# Patient Record
Sex: FEMALE | Race: WHITE | NOT HISPANIC OR LATINO | Employment: FULL TIME | URBAN - METROPOLITAN AREA
[De-identification: names, ages, dates, MRNs, and addresses within clinical notes are randomized per-mention and may not be internally consistent; named-entity substitution may affect disease eponyms.]

---

## 2017-02-04 ENCOUNTER — OFFICE VISIT (OUTPATIENT)
Dept: URGENT CARE | Facility: URGENT CARE | Age: 22
End: 2017-02-04
Payer: OTHER GOVERNMENT

## 2017-02-04 VITALS
WEIGHT: 144.6 LBS | HEART RATE: 104 BPM | SYSTOLIC BLOOD PRESSURE: 102 MMHG | RESPIRATION RATE: 16 BRPM | OXYGEN SATURATION: 97 % | TEMPERATURE: 98.6 F | DIASTOLIC BLOOD PRESSURE: 66 MMHG

## 2017-02-04 DIAGNOSIS — J10.1 INFLUENZA B: Primary | ICD-10-CM

## 2017-02-04 DIAGNOSIS — R50.9 FEVER, UNSPECIFIED: ICD-10-CM

## 2017-02-04 LAB
FLUAV+FLUBV AG SPEC QL: ABNORMAL
FLUAV+FLUBV AG SPEC QL: ABNORMAL
SPECIMEN SOURCE: ABNORMAL

## 2017-02-04 PROCEDURE — 87804 INFLUENZA ASSAY W/OPTIC: CPT | Performed by: FAMILY MEDICINE

## 2017-02-04 PROCEDURE — 99202 OFFICE O/P NEW SF 15 MIN: CPT | Performed by: FAMILY MEDICINE

## 2017-02-04 NOTE — MR AVS SNAPSHOT
After Visit Summary   2/4/2017    Silke Crawford    MRN: [id number]           Patient Information     Date Of Birth          1995        Visit Information        Provider Department      2/4/2017 8:15 PM Michael Miranda MD [facility]        Today's Diagnoses     Influenza B    -  1     Fever, unspecified           Care Instructions    Drink plenty of liquids    Get plenty of rest    Tylenol, ibuprofen for the fever, headaches, and body aches.     Go to the emergency room if you develop worsening shortness of breath or if you feel like you experience severe lightheadedness..        Influenza (Adult)    Influenza is also called the flu. It is a viral illness that affects the air passages of your lungs. It is different from the common cold. The flu can easily be passed from one to person to another. It may be spread through the air by coughing and sneezing. Or it can be spread by touching the sick person and then touching your own eyes, nose, or mouth.  The flu starts 1 to 3 days after you are exposed to the flu virus. It may last for 1 to 2 weeks. You usually don t need to take antibiotics unless you have a complication. This might be an ear or sinus infection or pneumonia.  Symptoms of the flu may be mild or severe. They can include extreme tiredness (wanting to stay in bed all day), chills, fevers, muscle aches, soreness with eye movement, headache, and a dry, hacking cough.  Home care  Follow these guidelines when caring for yourself at home:    Avoid being around cigarette smoke, whether yours or other people s.    Acetaminophen or ibuprofen will help ease your fever, muscle aches, and headache. Don t give aspirin to anyone younger than 18 who has the flu. Aspirin can harm the liver.    Nausea and loss of appetite are common with the flu. Eat light meals. Drink 6 to 8 glasses of liquids every day. Good choices are water, sport drinks, soft drinks without caffeine, juices, tea, and  soup. Extra fluids will also help loosen secretions in your nose and lungs.    Over-the-counter cold medicines will not make the flu go away faster. But the medicines may help with coughing, sore throat, and congestion in your nose and sinuses. Don t use a decongestant if you have high blood pressure.    Stay home until your fever has been gone for at least 24 hours without using medicine to reduce fever.  Follow-up care  Follow up with your health care provider, or as advised, if you are not getting better over the next week.  If you are 65 or older, talk with your provider about getting a pneumococcal vaccine every 5 years. You should also get this vaccine if you have chronic asthma or COPD. All adults should get a flu vaccine every fall. Ask your provider about this.  When to seek medical advice  Call your health care provider right away if any of these occur:    Cough with lots of colored sputum (mucus) or blood in your sputum    Chest pain, shortness of breath, wheezing, or difficulty breathing    Severe headache, or face, neck, or ear pain    New rash with fever    Fever of 101 F (38 C) oral or higher that doesn t get better with fever medicine    Confusion, behavior change, or seizure    Severe weakness or dizziness    You get a fever or cough after getting better for a few days       9078-0685 [employer]. [address] [place] [address], [place] [address]. All rights reserved. This information is not intended as a substitute for professional medical care. Always follow your healthcare professional's instructions.              Follow-ups after your visit        Who to contact     If you have questions or need follow up information about today's clinic visit or your schedule please contact [facility] directly at [phone].  Normal or non-critical lab and imaging results will be communicated to you by MyChart, letter or phone within 4 business days after the clinic has received the  results. If you do not hear from us within 7 days, please contact the clinic through [employer] or phone. If you have a critical or abnormal lab result, we will notify you by phone as soon as possible.  Submit refill requests through [employer] or call your pharmacy and they will forward the refill request to us. Please allow 3 business days for your refill to be completed.          Additional Information About Your Visit        "[email or web address] Information     [employer] gives you secure access to your electronic health record. If you see a primary care provider, you can also send messages to your care team and make appointments. If you have questions, please call your primary care clinic.  If you do not have a primary care provider, please call [phone] and they will assist you.        Care EveryWhere ID     This is your Care EveryWhere ID. This could be used by other organizations to access your Crawford medical records  [id number]        Your Vitals Were     Pulse Temperature Respirations Pulse Oximetry          104 98.6  F (37  C) (Oral) 16 97%         Blood Pressure from Last 3 Encounters:   02/04/17 102/66   12/19/15 117/71   03/30/15 120/76    Weight from Last 3 Encounters:   02/04/17 144 lb 9.6 oz (65.59 kg)              We Performed the Following     Influenza A/B antigen        Primary Care Provider Office Phone # Fax #    Shira Bustillos [phone]       [facility] [place] [address] [place] [address]        Thank you!     Thank you for choosing [facility]  for your care. Our goal is always to provide you with excellent care. Hearing back from our patients is one way we can continue to improve our services. Please take a few minutes to complete the written survey that you may receive in the mail after your visit with us. Thank you!             Your Updated Medication List - Protect others around you: Learn how to safely use, store and throw away your  medicines at www.disposemymeds.org.          This list is accurate as of: 2/4/17  9:13 PM.  Always use your most recent med list.                   Brand Name Dispense Instructions for use    PROBIOTIC ACIDOPHILUS PO          SPRINTEC 28 PO          WELLBUTRIN PO

## 2017-02-05 NOTE — PROGRESS NOTES
Chief Complaint   Patient presents with     Sinus Problem     fever onset wed am 104, congestion, ST, chest congestion, difficulty breathing, sinus pain, jaw pain, ear pain, head pain x 4 days OTC: IBU, tylenol     Urgent Care       Initial /66 mmHg  Pulse 104  Temp(Src) 98.6  F (37  C) (Oral)  Resp 16  Wt 144 lb 9.6 oz (65.59 kg)  SpO2 97% There is no height on file to calculate BMI.  BP completed using cuff size: keshav Juarez CMA                                2/4/2017 8:24 PM

## 2017-02-05 NOTE — PATIENT INSTRUCTIONS
Drink plenty of liquids    Get plenty of rest    Tylenol, ibuprofen for the fever, headaches, and body aches.     Go to the emergency room if you develop worsening shortness of breath or if you feel like you experience severe lightheadedness..        Influenza (Adult)    Influenza is also called the flu. It is a viral illness that affects the air passages of your lungs. It is different from the common cold. The flu can easily be passed from one to person to another. It may be spread through the air by coughing and sneezing. Or it can be spread by touching the sick person and then touching your own eyes, nose, or mouth.  The flu starts 1 to 3 days after you are exposed to the flu virus. It may last for 1 to 2 weeks. You usually don t need to take antibiotics unless you have a complication. This might be an ear or sinus infection or pneumonia.  Symptoms of the flu may be mild or severe. They can include extreme tiredness (wanting to stay in bed all day), chills, fevers, muscle aches, soreness with eye movement, headache, and a dry, hacking cough.  Home care  Follow these guidelines when caring for yourself at home:    Avoid being around cigarette smoke, whether yours or other people s.    Acetaminophen or ibuprofen will help ease your fever, muscle aches, and headache. Don t give aspirin to anyone younger than 18 who has the flu. Aspirin can harm the liver.    Nausea and loss of appetite are common with the flu. Eat light meals. Drink 6 to 8 glasses of liquids every day. Good choices are water, sport drinks, soft drinks without caffeine, juices, tea, and soup. Extra fluids will also help loosen secretions in your nose and lungs.    Over-the-counter cold medicines will not make the flu go away faster. But the medicines may help with coughing, sore throat, and congestion in your nose and sinuses. Don t use a decongestant if you have high blood pressure.    Stay home until your fever has been gone for at least 24 hours  without using medicine to reduce fever.  Follow-up care  Follow up with your health care provider, or as advised, if you are not getting better over the next week.  If you are 65 or older, talk with your provider about getting a pneumococcal vaccine every 5 years. You should also get this vaccine if you have chronic asthma or COPD. All adults should get a flu vaccine every fall. Ask your provider about this.  When to seek medical advice  Call your health care provider right away if any of these occur:    Cough with lots of colored sputum (mucus) or blood in your sputum    Chest pain, shortness of breath, wheezing, or difficulty breathing    Severe headache, or face, neck, or ear pain    New rash with fever    Fever of 101 F (38 C) oral or higher that doesn t get better with fever medicine    Confusion, behavior change, or seizure    Severe weakness or dizziness    You get a fever or cough after getting better for a few days       8524-7393 [employer]. [address] [place] [address], [place] [address]. All rights reserved. This information is not intended as a substitute for professional medical care. Always follow your healthcare professional's instructions.

## 2017-02-05 NOTE — PROGRESS NOTES
SUBJECTIVE:   Silke Crawford is a 21 year old female presenting with a chief complaint of fevers up to 104 F, nasal congestion, sore throat, chest congestion,, sinus pain (bilateral forehead , pressure when smiling and when bending over.  Also, some pain at the cheeks), jaw joint  (bilateral), bilateral forehead and temple head pain  Onset of symptoms was three days ago.  Course of illness is still present.     Severity severe.   Current and Associated symptoms: aas listed above. Treatment measures tried include Motrin, Tylenol Severe, Sawyer-Synephrin nasal spray. .  Predisposing factors include dad has been ill with chest congestion and deep cough.  .    Past medical history:    No major medical problems    Current Outpatient Prescriptions   Medication Sig Dispense Refill     Lactobacillus (PROBIOTIC ACIDOPHILUS PO)        BuPROPion HCl (WELLBUTRIN PO)        Norgestimate-Eth Estradiol (SPRINTEC 28 PO)        Social History   Substance Use Topics     Smoking status: Never Smoker      Smokeless tobacco: Not on file     Alcohol Use: Yes       ROS:  Review of systems negative except as stated above.    OBJECTIVE  :/66 mmHg  Pulse 104  Temp(Src) 98.6  F (37  C) (Oral)  Resp 16  Wt 144 lb 9.6 oz (65.59 kg)  SpO2 97%  GENERAL APPEARANCE: healthy, alert and no distress. No acute respiratory distress.  Patient is sniffling a lot.  Patient looks fatigued.    EYES: Eyes grossly normal to inspection  HENT: TM's normal bilaterally, nasal turbinates erythematous, swollen and oropharynx is mildly erythematous without exudates.   NECK: cervical adenopathy at the bilateral submandibular areas.   RESP: lungs clear to auscultation - no rales, rhonchi or wheezes  CV: regular rates and rhythm, normal S1 S2, no murmur noted    LABS:    Results for orders placed or performed in visit on 02/04/17   Influenza A/B antigen   Result Value Ref Range    Influenza A/B Agn Specimen Nasal     Influenza A  NEG     Negative   Test results  must be correlated with clinical data. If necessary, results   should be confirmed by a molecular assay or viral culture.      Influenza B (A) NEG     Positive   Test results must be correlated with clinical data. If necessary, results   should be confirmed by a molecular assay or viral culture.           ASSESSMENT:  Fever  Influenza B    PLAN:  Fluids, Rest, Tylenol, Ibuprofen  Go to the emergency room if you develop worsening shortness of breath  See orders in Epic    Michael Miranda MD

## 2017-02-23 ENCOUNTER — OFFICE VISIT (OUTPATIENT)
Dept: URGENT CARE | Facility: URGENT CARE | Age: 22
End: 2017-02-23
Payer: OTHER GOVERNMENT

## 2017-02-23 VITALS
HEART RATE: 109 BPM | TEMPERATURE: 99.3 F | OXYGEN SATURATION: 97 % | WEIGHT: 143 LBS | DIASTOLIC BLOOD PRESSURE: 70 MMHG | SYSTOLIC BLOOD PRESSURE: 122 MMHG

## 2017-02-23 DIAGNOSIS — B96.89 BACTERIAL VAGINOSIS: ICD-10-CM

## 2017-02-23 DIAGNOSIS — N76.0 BACTERIAL VAGINOSIS: ICD-10-CM

## 2017-02-23 DIAGNOSIS — J02.9 ACUTE PHARYNGITIS, UNSPECIFIED: Primary | ICD-10-CM

## 2017-02-23 DIAGNOSIS — N89.8 VAGINAL DISCHARGE: ICD-10-CM

## 2017-02-23 LAB
DEPRECATED S PYO AG THROAT QL EIA: NORMAL
MICRO REPORT STATUS: ABNORMAL
MICRO REPORT STATUS: NORMAL
SPECIMEN SOURCE: ABNORMAL
SPECIMEN SOURCE: NORMAL
WET PREP SPEC: ABNORMAL

## 2017-02-23 PROCEDURE — 87591 N.GONORRHOEAE DNA AMP PROB: CPT | Performed by: FAMILY MEDICINE

## 2017-02-23 PROCEDURE — 87081 CULTURE SCREEN ONLY: CPT | Performed by: FAMILY MEDICINE

## 2017-02-23 PROCEDURE — 99214 OFFICE O/P EST MOD 30 MIN: CPT | Performed by: FAMILY MEDICINE

## 2017-02-23 PROCEDURE — 87210 SMEAR WET MOUNT SALINE/INK: CPT | Performed by: FAMILY MEDICINE

## 2017-02-23 PROCEDURE — 87880 STREP A ASSAY W/OPTIC: CPT | Performed by: FAMILY MEDICINE

## 2017-02-23 PROCEDURE — 87491 CHLMYD TRACH DNA AMP PROBE: CPT | Performed by: FAMILY MEDICINE

## 2017-02-23 RX ORDER — METRONIDAZOLE 500 MG/1
500 TABLET ORAL 2 TIMES DAILY
Qty: 14 TABLET | Refills: 0 | Status: SHIPPED | OUTPATIENT
Start: 2017-02-23 | End: 2017-03-02

## 2017-02-23 RX ORDER — DIPHENHYDRAMINE HYDROCHLORIDE AND LIDOCAINE HYDROCHLORIDE AND ALUMINUM HYDROXIDE AND MAGNESIUM HYDRO
10 KIT EVERY 6 HOURS PRN
Qty: 237 ML | Refills: 1 | Status: SHIPPED | OUTPATIENT
Start: 2017-02-23 | End: 2019-01-28

## 2017-02-23 NOTE — PROGRESS NOTES
SUBJECTIVE:   Silke Crawford is a 21 year old female presenting with a chief complaint of sore throat.  Low grade fever.  Onset of symptoms was 3 day(s) ago.  Course of illness is worsening.    Severity moderate  Current and Associated symptoms: sore throat  Treatment measures tried include Fluids, OTC meds - ibuprofen and Rest.  Predisposing factors include ill contacts, positive for influenza B - too late for treatment.    Patient also concerned about vaginal discharge for about 1 week.  Positive for odor, denies any itchiness.  Patient has noted some cramps.  Currently in monogamous relationship, would like to screen for STD just in case.    No past medical history on file.  Current Outpatient Prescriptions   Medication Sig Dispense Refill     Lactobacillus (PROBIOTIC ACIDOPHILUS PO)        BuPROPion HCl (WELLBUTRIN PO)        Norgestimate-Eth Estradiol (SPRINTEC 28 PO)        Social History   Substance Use Topics     Smoking status: Never Smoker     Smokeless tobacco: Not on file     Alcohol use Yes       ROS:  CONSTITUTIONAL:NEGATIVE for fever, chills, change in weight and POSITIVE  for fatigue and malaise  INTEGUMENTARY/SKIN: NEGATIVE for worrisome rashes, moles or lesions  ENT/MOUTH: POSITIVE for nasal congestion, sore throat and swollen glands  RESP:NEGATIVE for significant cough or SOB  CV: NEGATIVE for chest pain, palpitations or peripheral edema  GI: NEGATIVE for nausea, abdominal pain, heartburn, or change in bowel habits  : negative for, dysuria, frequency and POSITIVE for vaginal discharge  MUSCULOSKELETAL: NEGATIVE for significant arthralgias or myalgia  PSYCHIATRIC: NEGATIVE for changes in mood or affect    OBJECTIVE  :/70  Pulse 109  Temp 99.3  F (37.4  C) (Tympanic)  Wt 143 lb (64.9 kg)  SpO2 97%  GENERAL APPEARANCE: healthy, alert and no distress  EYES: EOMI,  PERRL, conjunctiva clear  HENT: ear canals and TM's normal.  Nose and mouth without ulcers, erythema or lesions.  Pharynx mildly  redden, no exudates.  No sinus tenderness on percussion.  NECK: supple, nontender, no lymphadenopathy  RESP: lungs clear to auscultation - no rales, rhonchi or wheezes  CV: regular rates and rhythm, normal S1 S2, no murmur noted  ABDOMEN:  soft, nontender, no HSM or masses and bowel sounds normal  NEURO: Normal strength and tone, sensory exam grossly normal,  normal speech and mentation  SKIN: no suspicious lesions or rashes  PSYCH: mentation appears normal and affect normal/bright    Results for orders placed or performed in visit on 02/23/17   Rapid strep screen   Result Value Ref Range    Specimen Description Throat     Rapid Strep A Screen       NEGATIVE: No Group A streptococcal antigen detected by immunoassay, await   culture report.      Micro Report Status FINAL 02/23/2017    Wet prep   Result Value Ref Range    Specimen Description Vagina     Wet Prep (A)      No Trichomonas seen  No yeast seen  Clue cells seen      Micro Report Status FINAL 02/23/2017        ASSESSMENT/PLAN:  (J02.9) Acute pharyngitis, unspecified  (primary encounter diagnosis)  Comment: viral  Plan: Rapid strep screen, Beta strep group A culture,        DPH-Lido-AlHydr-MgHydr-Simeth (FIRST-MOUTHWASH         BLM) SUSP            (N89.8) Vaginal discharge  Comment: BV  Plan: Wet prep, Neisseria gonorrhoeae PCR, Chlamydia         trachomatis PCR            (N76.0,  B96.89) Bacterial vaginosis  Plan: metroNIDAZOLE (FLAGYL) 500 MG tablet            Reassurance given, reviewed symptomatic treatment, plenty of fluids and rest.  RX magic mouthwash given for symptom treatment, will follow up on throat culture and treat if positive for strep.    Reviewed positive BV and discussed treatment options, encourage increase yogurt or probiotic to help with vaginal shaw.  Discussed safe sex, will follow up on STD screen and notify if any abnormalities.    Work excuse note given.  Return to clinic if no resolution of symptoms.      Bharath Alvarado MD  February  23, 2017 10:37 AM

## 2017-02-23 NOTE — PATIENT INSTRUCTIONS
Okay to take ibuprofen 200 mg - 4 tablets (800 mg) every 8 hours as needed.  Okay to take tylenol 500 mg - 2 tablets (1000 mg) every 6-8 hours as needed, do not exceed 3000 mg in 24 hours.  Okay to take magic mouthwash to help with sore throat.  We will contact you if the throat culture is positive for strep.    Take full course of antibiotic for bacterial vaginosis.  No alcohol while on antibiotic.      Viral Pharyngitis (Sore Throat)    You (or your child, if your child is the patient) have pharyngitis (sore throat). This infection is caused by a virus. It can cause throat pain that is worse when swallowing, aching all over, headache, and fever. The infection may be spread by coughing, kissing, or touching others after touching your mouth or nose. Antibiotic medications do not work against viruses, so they are not used for treating this condition.  Home care    If your symptoms are severe, rest at home. Return to work or school when you feel well enough.     Drink plenty of fluids to avoid dehydration.    For children: Use acetaminophen for fever, fussiness or discomfort. In infants over six months of age, you may use ibuprofen instead of acetaminophen. (NOTE: If your child has chronic liver or kidney disease or ever had a stomach ulcer or GI bleeding, talk with your doctor before using these medicines.) (NOTE: Aspirin should never be used in anyone under 18 years of age who is ill with a fever. It may cause severe liver damage.)     For adults: You may use acetaminophen or ibuprofen to control pain or fever, unless another medicine was prescribed for this. (NOTE: If you have chronic liver or kidney disease or ever had a stomach ulcer or GI bleeding, talk with your doctor before using these medicines.)    Throat lozenges or numbing throat sprays can help reduce pain. Gargling with warm salt water will also help reduce throat pain. For this, dissolve 1/2 teaspoon of salt in 1 glass of warm water. To help soothe a  sore throat, children can sip on juice or a popsicle. Children 5 years and older can also suck on a lollipop or hard candy.    Avoid salty or spicy foods, which can be irritating to the throat.  Follow-up care  Follow up with your healthcare provider or our staff if you are not improving over the next week.  When to seek medical advice  Call your healthcare provider right away if any of these occur:    Fever as directed by your doctor.  For children, seek care if:    Your child is of any age and has repeated fevers above 104 F (40 C).    Your child is younger than 2 years of age and has a fever of 100.4 F (38 C) that continues for more than 1 day.    Your child is 2 years old or older and has a fever of 100.4 F (38 C) that continues for more than 3 days.    New or worsening ear pain, sinus pain, or headache    Painful lumps in the back of neck    Stiff neck    Lymph nodes are getting larger    Inability to swallow liquids, excessive drooling, or inability to open mouth wide due to throat pain    Signs of dehydration (very dark urine or no urine, sunken eyes, dizziness)    Trouble breathing or noisy breathing    Muffled voice    New rash    Child appears to be getting sicker    6025-2730 [employer]. [address] [place] [address], [place] [address]. All rights reserved. This information is not intended as a substitute for professional medical care. Always follow your healthcare professional's instructions.        Bacterial Vaginosis    You have a vaginal infection called bacterial vaginosis (BV). Both good and bad bacteria are present in a healthy vagina. BV occurs when these bacteria get out of balance. The number of bad bacteria increase. And the number of good bacteria decrease.  BV may or may not cause symptoms. If symptoms do occur, they can include:    Thin, gray, milky-white, or sometimes green discharge    Unpleasant odor or  fishy  smell    Itching, burning, or pain in or around the vagina  It is not  known what causes BV, but certain factors can make the problem more likely. This can include:    Douching    Having sex with a new partner    Having sex with more than one partner  BV will sometimes go away on its own. But treatment is usually recommended. This is because untreated BV can increase the risk of more serious health problems such as:    Pelvic inflammatory disease (PID)     delivery (giving birth to a baby early if you re pregnant)    HIV and certain other sexually transmitted diseases (STDs)    Infection after surgery on the reproductive organs  Home care  General care    BV is most often treated with medicines called antibiotics. These may be given as pills or as a vaginal cream. If antibiotics are prescribed, be sure to use them exactly as directed. Also, be sure to complete all of the medicine, even if your symptoms go away.    Avoid douching or having sex during treatment.    If you have sex with a female partner, ask your healthcare provider if she should also be treated.  Prevention    Limit or avoid douching.    Avoid having sex. If you do have sex, then take steps to lower your risk:    Use condoms when having sex.    Limit the number of partners you have sex with.  Follow-up care  Follow up with your healthcare provider, or as advised.  When to seek medical advice  Call your healthcare provider right away if:    You have a fever of 100.4 F (38 C) or higher, or as directed by your provider.    Your symptoms worsen, or they don t go away within a few days of starting treatment.    You have new pain in the lower belly or pelvic region.    You have side effects that bother you or a reaction to the pills or cream you re prescribed.    You or any partners you have sex with have new symptoms, such as a rash, joint pain, or sores.    5028-4431 [employer]. [address] [place] [address]. All rights reserved. This information is not intended as a substitute for  professional medical care. Always follow your healthcare professional's instructions.

## 2017-02-23 NOTE — NURSING NOTE
Chief Complaint   Patient presents with     Urgent Care     Pharyngitis     Started Tues, white spots. OTC- advil      Vaginal Problem     Odor and dx, has had BV in the past. Would like to have CT/GC done as well.      Fever     Fever 101.2 this AM,        Initial /70  Pulse 109  Temp 99.3  F (37.4  C) (Tympanic)  Wt 143 lb (64.9 kg)  SpO2 97% There is no height or weight on file to calculate BMI.  Medication Reconciliation: complete    Lucila Bliss

## 2017-02-23 NOTE — LETTER
[facility]  [address]  [address]  [facility] [address]  [phone]  Dept: [phone]      2/23/2017    Re: Silke WOOD Maddiestan      TO WHOM IT MAY CONCERN:    Silke Crawford  was seen on 2/23/2017.  Please excuse her from work 2/22 - 2/23 due to illness.      Tino Alvarado MD  [facility]

## 2017-02-23 NOTE — MR AVS SNAPSHOT
After Visit Summary   2/23/2017    Silke Crawford    MRN: [id number]           Patient Information     Date Of Birth          1995        Visit Information        Provider Department      2/23/2017 9:45 AM Bharath Alvarado MD [facility]        Today's Diagnoses     Acute pharyngitis, unspecified    -  1    Vaginal discharge        Bacterial vaginosis          Care Instructions    Okay to take ibuprofen 200 mg - 4 tablets (800 mg) every 8 hours as needed.  Okay to take tylenol 500 mg - 2 tablets (1000 mg) every 6-8 hours as needed, do not exceed 3000 mg in 24 hours.  Okay to take magic mouthwash to help with sore throat.  We will contact you if the throat culture is positive for strep.    Take full course of antibiotic for bacterial vaginosis.  No alcohol while on antibiotic.      Viral Pharyngitis (Sore Throat)    You (or your child, if your child is the patient) have pharyngitis (sore throat). This infection is caused by a virus. It can cause throat pain that is worse when swallowing, aching all over, headache, and fever. The infection may be spread by coughing, kissing, or touching others after touching your mouth or nose. Antibiotic medications do not work against viruses, so they are not used for treating this condition.  Home care    If your symptoms are severe, rest at home. Return to work or school when you feel well enough.     Drink plenty of fluids to avoid dehydration.    For children: Use acetaminophen for fever, fussiness or discomfort. In infants over six months of age, you may use ibuprofen instead of acetaminophen. (NOTE: If your child has chronic liver or kidney disease or ever had a stomach ulcer or GI bleeding, talk with your doctor before using these medicines.) (NOTE: Aspirin should never be used in anyone under 18 years of age who is ill with a fever. It may cause severe liver damage.)     For adults: You may use acetaminophen or ibuprofen to control pain or  fever, unless another medicine was prescribed for this. (NOTE: If you have chronic liver or kidney disease or ever had a stomach ulcer or GI bleeding, talk with your doctor before using these medicines.)    Throat lozenges or numbing throat sprays can help reduce pain. Gargling with warm salt water will also help reduce throat pain. For this, dissolve 1/2 teaspoon of salt in 1 glass of warm water. To help soothe a sore throat, children can sip on juice or a popsicle. Children 5 years and older can also suck on a lollipop or hard candy.    Avoid salty or spicy foods, which can be irritating to the throat.  Follow-up care  Follow up with your healthcare provider or our staff if you are not improving over the next week.  When to seek medical advice  Call your healthcare provider right away if any of these occur:    Fever as directed by your doctor.  For children, seek care if:    Your child is of any age and has repeated fevers above 104 F (40 C).    Your child is younger than 2 years of age and has a fever of 100.4 F (38 C) that continues for more than 1 day.    Your child is 2 years old or older and has a fever of 100.4 F (38 C) that continues for more than 3 days.    New or worsening ear pain, sinus pain, or headache    Painful lumps in the back of neck    Stiff neck    Lymph nodes are getting larger    Inability to swallow liquids, excessive drooling, or inability to open mouth wide due to throat pain    Signs of dehydration (very dark urine or no urine, sunken eyes, dizziness)    Trouble breathing or noisy breathing    Muffled voice    New rash    Child appears to be getting sicker    9794-0366 [employer]. [address] [place] [address]. All rights reserved. This information is not intended as a substitute for professional medical care. Always follow your healthcare professional's instructions.        Bacterial Vaginosis    You have a vaginal infection called bacterial vaginosis (BV).  Both good and bad bacteria are present in a healthy vagina. BV occurs when these bacteria get out of balance. The number of bad bacteria increase. And the number of good bacteria decrease.  BV may or may not cause symptoms. If symptoms do occur, they can include:    Thin, gray, milky-white, or sometimes green discharge    Unpleasant odor or  fishy  smell    Itching, burning, or pain in or around the vagina  It is not known what causes BV, but certain factors can make the problem more likely. This can include:    Douching    Having sex with a new partner    Having sex with more than one partner  BV will sometimes go away on its own. But treatment is usually recommended. This is because untreated BV can increase the risk of more serious health problems such as:    Pelvic inflammatory disease (PID)     delivery (giving birth to a baby early if you re pregnant)    HIV and certain other sexually transmitted diseases (STDs)    Infection after surgery on the reproductive organs  Home care  General care    BV is most often treated with medicines called antibiotics. These may be given as pills or as a vaginal cream. If antibiotics are prescribed, be sure to use them exactly as directed. Also, be sure to complete all of the medicine, even if your symptoms go away.    Avoid douching or having sex during treatment.    If you have sex with a female partner, ask your healthcare provider if she should also be treated.  Prevention    Limit or avoid douching.    Avoid having sex. If you do have sex, then take steps to lower your risk:    Use condoms when having sex.    Limit the number of partners you have sex with.  Follow-up care  Follow up with your healthcare provider, or as advised.  When to seek medical advice  Call your healthcare provider right away if:    You have a fever of 100.4 F (38 C) or higher, or as directed by your provider.    Your symptoms worsen, or they don t go away within a few days of starting  treatment.    You have new pain in the lower belly or pelvic region.    You have side effects that bother you or a reaction to the pills or cream you re prescribed.    You or any partners you have sex with have new symptoms, such as a rash, joint pain, or sores.    3066-7848 [employer] [address] [place] [address]. All rights reserved. This information is not intended as a substitute for professional medical care. Always follow your healthcare professional's instructions.              Follow-ups after your visit        Follow-up notes from your care team     Return if symptoms worsen or fail to improve.      Who to contact     If you have questions or need follow up information about today's clinic visit or your schedule please contact [facility] directly at [phone].  Normal or non-critical lab and imaging results will be communicated to you by OpenRoutehart, letter or phone within 4 business days after the clinic has received the results. If you do not hear from us within 7 days, please contact the clinic through OpenRoutehart or phone. If you have a critical or abnormal lab result, we will notify you by phone as soon as possible.  Submit refill requests through [employer] or call your pharmacy and they will forward the refill request to us. Please allow 3 business days for your refill to be completed.          Additional Information About Your Visit        OpenRoutehart Information     [employer] gives you secure access to your electronic health record. If you see a primary care provider, you can also send messages to your care team and make appointments. If you have questions, please call your primary care clinic.  If you do not have a primary care provider, please call [phone] and they will assist you.        Care EveryWhere ID     This is your Care EveryWhere ID. This could be used by other organizations to access your [place] medical records  [id number]        Your Vitals Were      Pulse Temperature Pulse Oximetry             109 99.3  F (37.4  C) (Tympanic) 97%          Blood Pressure from Last 3 Encounters:   02/23/17 122/70   02/04/17 102/66   12/19/15 117/71    Weight from Last 3 Encounters:   02/23/17 143 lb (64.9 kg)   02/04/17 144 lb 9.6 oz (65.6 kg)              We Performed the Following     Beta strep group A culture     Chlamydia trachomatis PCR     Neisseria gonorrhoeae PCR     Rapid strep screen     Wet prep          Today's Medication Changes          These changes are accurate as of: 2/23/17 10:28 AM.  If you have any questions, ask your nurse or doctor.               Start taking these medicines.        Dose/Directions    FIRST-MOUTHWASH BLM Susp   Used for:  Acute pharyngitis, unspecified   Started by:  Bharath Alvarado MD        Dose:  10 mL   Swish and swallow 10 mLs in mouth every 6 hours as needed   Quantity:  237 mL   Refills:  1       metroNIDAZOLE 500 MG tablet   Commonly known as:  FLAGYL   Used for:  Bacterial vaginosis   Started by:  Bharath Alvarado MD        Dose:  500 mg   Take 1 tablet (500 mg) by mouth 2 times daily for 7 days   Quantity:  14 tablet   Refills:  0            Where to get your medicines      These medications were sent to [facility] [address] [place] [id number]    Hours:  24-hours Phone:  [phone]     FIRST-MOUTHWASH BLM Susp    metroNIDAZOLE 500 MG tablet                Primary Care Provider Office Phone # Fax #    Shira Bustillos [phone]       [address] [place] [address] [address] [address]        Thank you!     Thank you for choosing [facility]  for your care. Our goal is always to provide you with excellent care. Hearing back from our patients is one way we can continue to improve our services. Please take a few minutes to complete the written survey that you may receive in the  mail after your visit with us. Thank you!             Your Updated Medication List - Protect others around you: Learn how to safely use, store and throw away your medicines at www.disposemymeds.org.          This list is accurate as of: 2/23/17 10:28 AM.  Always use your most recent med list.                   Brand Name Dispense Instructions for use    FIRST-MOUTHWASH BLM Susp     237 mL    Swish and swallow 10 mLs in mouth every 6 hours as needed       metroNIDAZOLE 500 MG tablet    FLAGYL    14 tablet    Take 1 tablet (500 mg) by mouth 2 times daily for 7 days       PROBIOTIC ACIDOPHILUS PO          SPRINTEC 28 PO          WELLBUTRIN PO

## 2017-02-24 LAB
C TRACH DNA SPEC QL NAA+PROBE: NORMAL
N GONORRHOEA DNA SPEC QL NAA+PROBE: NORMAL
SPECIMEN SOURCE: NORMAL
SPECIMEN SOURCE: NORMAL

## 2017-02-25 LAB
BACTERIA SPEC CULT: NORMAL
MICRO REPORT STATUS: NORMAL
SPECIMEN SOURCE: NORMAL

## 2017-12-24 ENCOUNTER — HEALTH MAINTENANCE LETTER (OUTPATIENT)
Age: 22
End: 2017-12-24

## 2018-11-23 ENCOUNTER — OFFICE VISIT (OUTPATIENT)
Dept: URGENT CARE | Facility: URGENT CARE | Age: 23
End: 2018-11-23
Payer: COMMERCIAL

## 2018-11-23 VITALS
DIASTOLIC BLOOD PRESSURE: 64 MMHG | SYSTOLIC BLOOD PRESSURE: 116 MMHG | OXYGEN SATURATION: 98 % | TEMPERATURE: 99.8 F | HEART RATE: 95 BPM

## 2018-11-23 DIAGNOSIS — S13.4XXA WHIPLASH INJURIES, INITIAL ENCOUNTER: Primary | ICD-10-CM

## 2018-11-23 PROCEDURE — 99213 OFFICE O/P EST LOW 20 MIN: CPT | Performed by: FAMILY MEDICINE

## 2018-11-23 RX ORDER — CYCLOBENZAPRINE HCL 10 MG
10 TABLET ORAL 3 TIMES DAILY PRN
Qty: 30 TABLET | Refills: 0 | Status: SHIPPED | OUTPATIENT
Start: 2018-11-23 | End: 2019-05-01
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
## 2019-01-28 ENCOUNTER — DOCUMENTATION ONLY (OUTPATIENT)
Dept: FAMILY MEDICINE | Facility: CLINIC | Age: 24
End: 2019-01-28

## 2019-01-28 ENCOUNTER — OFFICE VISIT (OUTPATIENT)
Dept: FAMILY MEDICINE | Facility: CLINIC | Age: 24
End: 2019-01-28
Payer: COMMERCIAL

## 2019-01-28 VITALS
SYSTOLIC BLOOD PRESSURE: 113 MMHG | OXYGEN SATURATION: 97 % | HEIGHT: 60 IN | HEART RATE: 83 BPM | WEIGHT: 188.1 LBS | DIASTOLIC BLOOD PRESSURE: 69 MMHG | TEMPERATURE: 100.2 F | BODY MASS INDEX: 36.93 KG/M2

## 2019-01-28 DIAGNOSIS — R42 VERTIGO: Primary | ICD-10-CM

## 2019-01-28 DIAGNOSIS — R63.5 WEIGHT GAIN: ICD-10-CM

## 2019-01-28 DIAGNOSIS — R63.5 WEIGHT GAIN: Primary | ICD-10-CM

## 2019-01-28 PROCEDURE — 99213 OFFICE O/P EST LOW 20 MIN: CPT | Performed by: INTERNAL MEDICINE

## 2019-01-28 RX ORDER — MECLIZINE HYDROCHLORIDE 25 MG/1
25 TABLET ORAL 3 TIMES DAILY PRN
Qty: 30 TABLET | Refills: 1 | Status: SHIPPED | OUTPATIENT
Start: 2019-01-28 | End: 2019-05-01

## 2019-01-28 ASSESSMENT — MIFFLIN-ST. JEOR: SCORE: 1519.72
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
## 2019-01-28 NOTE — PATIENT INSTRUCTIONS
Take Meclizine as directed for your vertigo.    Follow up if your dizziness persist/worsens, or if you develop new symptoms or side effects from the medication.        Patient Education     Benign Paroxysmal Positional Vertigo    Benign paroxysmal positional vertigo is a common condition. You feel as if the room is spinning after changing position, moving your head quickly, or even just rolling over in bed.  Vertigo is a false feeling of motion plus disorientation that makes it seem as though the room is spinning. A vertigo attack may cause sudden nausea, vomiting, and heavy sweating. Severe vertigo causes a loss of balance. You may even fall down.  Vertigo is caused by a problem with the inner ear. The inner ear is located behind the middle ear. It is a part of the balance center of the body. It contains small calcium particles within fluid-filled canals (semi-circular canals). These particles can move out of position. This may happen as a result of aging, head injury, or disease of the inner ear. Once that happens, moving your head in certain ways may cause the particles to stimulate the inner ear. This creates the feeling of vertigo.  An episode of vertigo may last seconds, minutes, or hours. Once you are over the first episode of vertigo, it may never return. Sometimes symptoms return off and on for several weeks or longer.  Home care  Follow these guidelines when caring for yourself at home:    Rest quietly in bed if your symptoms are severe. Change position slowly. There is usually 1 position that will feel best. This might be lying on 1 side or lying on your back with your head slightly raised on pillows. Until you have no symptoms, you are at a higher risk of falling. Let someone help you when you get up. Get rid of home hazards such as loose electrical cords and throw rugs. Don t walk in unfamiliar areas that are not lighted. Use night lights in bathrooms and kitchen areas.    Do not drive or work with  dangerous machinery for 1 week after symptoms go away. This is in case symptoms return suddenly.    Take medicine as prescribed to relieve your symptoms. Unless another medicine was prescribed for nausea, vomiting, and vertigo, you may use over-the-counter motion sickness medicine. Examples of this include meclizine and dimenhydrinate.  Follow-up care  Follow up with your healthcare provider, or as directed. Tell your provider about any ringing in your ear or hearing loss.  If you had a CT or MRI scan, a specialist will review it. You will be told of any new findings that may affect your care.  When to seek medical advice  Call your healthcare provider right away if any of these occur:    Vertigo gets worse even after taking prescribed medicine    Repeated vomiting even after taking prescribed medicine    Weakness that gets worse    Fainting    Severe headache or unusual drowsiness or confusion    Weakness of an arm or leg or 1 side of the face    Trouble walking    Trouble with speech or vision    Seizure    Trouble hearing    Fever of 100.4 F (38 C) or higher, or as directed by your healthcare provider    Fast heart rate    Chest pain   Date Last Reviewed: 11/1/2017 2000-2018 [employer]. [address] [place] [id number]. All rights reserved. This information is not intended as a substitute for professional medical care. Always follow your healthcare professional's instructions.

## 2019-01-28 NOTE — LETTER
[facility]  [address] [facility]  [address]  [place]  [address]  Tel: [phone]    February 14, 2019    Silke Crawford  [address] [address]        Aaron Edouard,     We have been unable to reach you by phone or MY CHART.     Dr. Martin wanted us to remind you to schedule a laboratory-only visit to recheck your thyroid function and screening test for diabetes as workup for your concern over her weight gain.      You can scheduled via MY CHART or call the clinic [phone]    Sincerely,    Marline VOSS MA per  Castillo Martin MD

## 2019-01-30 NOTE — TELEPHONE ENCOUNTER
Please remind patient to schedule a laboratory-only visit to recheck her thyroid function and screening test for diabetes as workup for her concern over her weight gain.

## 2019-01-31 ENCOUNTER — MYC MEDICAL ADVICE (OUTPATIENT)
Dept: FAMILY MEDICINE | Facility: CLINIC | Age: 24
End: 2019-01-31

## 2019-01-31 NOTE — PROGRESS NOTES
Dr. Martin's comment is showing up in a different section of this encounter as Telephone.   I sent pt a MY CHART message with his comment.     Castillo Martin MD   [facility] 20 hours ago (2:01 PM)        Please remind patient to schedule a laboratory-only visit to recheck her thyroid function and screening test for diabetes as workup for her concern over her weight gain.

## 2019-02-10 ASSESSMENT — ENCOUNTER SYMPTOMS
LOSS OF CONSCIOUSNESS: 0
CHILLS: 0
BLURRED VISION: 0
SPEECH CHANGE: 0
NAUSEA: 1
VOMITING: 0
HEADACHES: 0
COUGH: 0
SENSORY CHANGE: 0
FOCAL WEAKNESS: 0
ABDOMINAL PAIN: 0
DIZZINESS: 1
FEVER: 0

## 2019-02-14 NOTE — TELEPHONE ENCOUNTER
Unable to reach pt via phone or MY CHART  So mailed letter with reminder to sched lab only appt.      Marline VOSS MA

## 2019-04-15 NOTE — PROGRESS NOTES
"SUBJECTIVE:  Silke Crawford is an 23 year old woman who presents for   annual gyn exam. Patient's last menstrual period was 03/28/2019 (exact date). Periods are regular q 21 days, lasting 3-5 days. Dysmenorrhea: mild on first 1-2 days of menses. Cyclic symptoms include none. No intermenstrual bleeding, spotting, or discharge. STD hx: none.    Current contraception: abstinence -- no current partner  GILMAR exposure: no  History of abnormal Pap smear: No  Family history of uterine or ovarian cancer: No  Regular self breast exam: Yes  History of abnormal mammogram: No  Family history of breast cancer: Yes - Maternal Grandmother  History of abnormal lipids: No        - Pt report several skin tags on her labia, present for the past 5 years but just now starting to cause discomfort. She would like them to be removed today, if possible. Pt also would like a refill of her Wellbutrin, found this helpful with giving her a \"little boost\" to work out & decrease cravings for weight loss -- does note mild seasonal depression as well.           History reviewed. No pertinent past medical history.     Family History   Problem Relation Age of Onset     Melanoma Maternal Grandfather      Breast Cancer Maternal Grandmother        History reviewed. No pertinent surgical history.    Current Outpatient Medications   Medication     buPROPion (WELLBUTRIN XL) 300 MG 24 hr tablet     BuPROPion HCl (WELLBUTRIN PO)     cyclobenzaprine (FLEXERIL) 10 MG tablet     Lactobacillus (PROBIOTIC ACIDOPHILUS PO)     Norgestimate-Eth Estradiol (SPRINTEC 28 PO)     No current facility-administered medications for this visit.      Allergies   Allergen Reactions     Augmentin        Social History     Tobacco Use     Smoking status: Never Smoker     Smokeless tobacco: Never Used   Substance Use Topics     Alcohol use: Yes     Comment: 2-3 drinks per week       Review Of Systems  Ears/Nose/Throat: negative  Respiratory: No shortness of breath, dyspnea on " exertion, cough, or hemoptysis  Cardiovascular: negative  Gastrointestinal: negative  Genitourinary: negative  Constitutional, HEENT, cardiovascular, pulmonary, GI, , musculoskeletal, neuro, skin, endocrine and psych systems are negative, except as otherwise noted.        This document serves as a record of the services and decisions personally performed and made by Orquidea Hardin DO. It was created on her behalf by Michelle Hayes, a trained medical scribe. The creation of this document is based the provider's statements to the medical scribe.  Scribe Michelle Hayes 2:08 PM, April 16, 2019    OBJECTIVE:  /70 (BP Location: Right arm, Patient Position: Sitting, Cuff Size: Adult Regular)   Ht 1.524 m (5')   Wt 81.9 kg (180 lb 9.6 oz)   LMP 03/28/2019 (Exact Date)   BMI 35.27 kg/m    General appearance: healthy, alert and no distress  Skin: Skin color, texture, turgor normal. No rashes or lesions.  Ears: negative  Nose/Sinuses: Nares normal. Septum midline. Mucosa normal. No drainage or sinus tenderness.  Oropharynx: Lips, mucosa, and tongue normal. Teeth and gums normal.  Neck: Neck supple. No adenopathy. Thyroid symmetric, normal size,, Carotids without bruits.  Lungs: negative, Percussion normal. Good diaphragmatic excursion. Lungs clear  Heart: negative, PMI normal. No lifts, heaves, or thrills. RRR. No murmurs, clicks gallops or rub  Breasts: Inspection negative. No nipple discharge or bleeding. No masses.  Abdomen: Abdomen soft, non-tender. BS normal. No masses, organomegaly  Pelvic: Pelvic examination with pap/ Gonorrhea and Chlamydia   including  External genitalia normal   and vagina normal rugatted not atrophic  Examination of urethra  normal no masses, tenderness, scarring  bladder, no masses or tenderness  Cervix no lesions or discharge  Bimanual exam with   Uterus 6 weeks size, mid position, mobile, no tenderness, no descent   Adnexa/parametria normal  5 skin taglike structure on each bilateral labia  minora, symmetrical in appearance, may be   Skin tag vs normal benign papilles.             ASSESSMENT:  Silke Crawford is an 23 year old woman who presents for   annual gyn exam.     PLAN:  Dx: Annual gyn physical; Skin tags; Seasonal affective disorder; Weight gain  1)  Will return for fasting labs; Pap smear - pathology pending; GC - pending; Mammogram not indicated at this age  2)  Skin tags: Discussed need for surgical removal, would send them to pathology to rule out abnormal tissue/cancer   - Pt will consider this treatment & report if desired   -she has pain that occurs because they can twist and get caught on undergartments and with activity     3)  Seasonal affective disorder & weight gain: Wellbutrin refilled   - Counseled on regular exercise & healthy diet  4)  Return to clinic in 1 year for annual gyn physical; otherwise as needed.       Rx:  - Wellbutrin  mg 24 HR 1 tab po every morning        PE:  Reviewed health maintenance including diet, regular exercise   and periodic exams.          The information in this document, created by the medical scribe for me, accurately reflects the services I personally performed and the decisions made by me. I have reviewed and approved this document for accuracy prior to leaving the patient care area.  2:07 PM, 04/16/19    Dr. Orquidea Hardin, DO    Obstetrics and Gynecology  [facility]

## 2019-04-16 ENCOUNTER — OFFICE VISIT (OUTPATIENT)
Dept: OBGYN | Facility: CLINIC | Age: 24
End: 2019-04-16
Payer: COMMERCIAL

## 2019-04-16 VITALS
DIASTOLIC BLOOD PRESSURE: 70 MMHG | SYSTOLIC BLOOD PRESSURE: 104 MMHG | HEIGHT: 60 IN | BODY MASS INDEX: 35.46 KG/M2 | WEIGHT: 180.6 LBS

## 2019-04-16 DIAGNOSIS — R63.5 WEIGHT GAIN: ICD-10-CM

## 2019-04-16 DIAGNOSIS — F33.8 SEASONAL AFFECTIVE DISORDER (H): ICD-10-CM

## 2019-04-16 DIAGNOSIS — Z00.00 ENCOUNTER FOR PREVENTATIVE ADULT HEALTH CARE EXAMINATION: Primary | ICD-10-CM

## 2019-04-16 LAB
ERYTHROCYTE [DISTWIDTH] IN BLOOD BY AUTOMATED COUNT: 12.3 % (ref 10–15)
HCT VFR BLD AUTO: 43.7 % (ref 35–47)
HGB BLD-MCNC: 14.5 G/DL (ref 11.7–15.7)
MCH RBC QN AUTO: 29.6 PG (ref 26.5–33)
MCHC RBC AUTO-ENTMCNC: 33.2 G/DL (ref 31.5–36.5)
MCV RBC AUTO: 89 FL (ref 78–100)
PLATELET # BLD AUTO: 400 10E9/L (ref 150–450)
RBC # BLD AUTO: 4.9 10E12/L (ref 3.8–5.2)
SPECIMEN SOURCE: NORMAL
WBC # BLD AUTO: 8.5 10E9/L (ref 4–11)
WET PREP SPEC: NORMAL

## 2019-04-16 PROCEDURE — 36415 COLL VENOUS BLD VENIPUNCTURE: CPT | Performed by: FAMILY MEDICINE

## 2019-04-16 PROCEDURE — 84443 ASSAY THYROID STIM HORMONE: CPT | Performed by: FAMILY MEDICINE

## 2019-04-16 PROCEDURE — 87210 SMEAR WET MOUNT SALINE/INK: CPT | Performed by: FAMILY MEDICINE

## 2019-04-16 PROCEDURE — 87491 CHLMYD TRACH DNA AMP PROBE: CPT | Performed by: FAMILY MEDICINE

## 2019-04-16 PROCEDURE — 99395 PREV VISIT EST AGE 18-39: CPT | Performed by: FAMILY MEDICINE

## 2019-04-16 PROCEDURE — 80061 LIPID PANEL: CPT | Performed by: FAMILY MEDICINE

## 2019-04-16 PROCEDURE — 85027 COMPLETE CBC AUTOMATED: CPT | Performed by: FAMILY MEDICINE

## 2019-04-16 PROCEDURE — 80053 COMPREHEN METABOLIC PANEL: CPT | Performed by: FAMILY MEDICINE

## 2019-04-16 PROCEDURE — G0145 SCR C/V CYTO,THINLAYER,RESCR: HCPCS | Performed by: FAMILY MEDICINE

## 2019-04-16 PROCEDURE — 87591 N.GONORRHOEAE DNA AMP PROB: CPT | Performed by: FAMILY MEDICINE

## 2019-04-16 RX ORDER — BUPROPION HYDROCHLORIDE 300 MG/1
300 TABLET ORAL EVERY MORNING
Qty: 90 TABLET | Refills: 3 | Status: SHIPPED | OUTPATIENT
Start: 2019-04-16 | End: 2022-09-23

## 2019-04-16 ASSESSMENT — MIFFLIN-ST. JEOR: SCORE: 1485.7

## 2019-04-16 NOTE — NURSING NOTE
"Chief Complaint   Patient presents with     Gyn Exam     pap due--pt has \"skin tags\" on labia--have been there about 5 years--starting to cause discomfort       Initial /70 (BP Location: Right arm, Patient Position: Sitting, Cuff Size: Adult Regular)   Ht 1.524 m (5')   Wt 81.9 kg (180 lb 9.6 oz)   LMP 03/28/2019 (Exact Date)   BMI 35.27 kg/m   Estimated body mass index is 35.27 kg/m  as calculated from the following:    Height as of this encounter: 1.524 m (5').    Weight as of this encounter: 81.9 kg (180 lb 9.6 oz).  BP completed using cuff size: regular    Questioned patient about current smoking habits.  Pt. has never smoked.      No obstetric history on file.    The following HM Due: pap smear           "

## 2019-04-16 NOTE — PATIENT INSTRUCTIONS
[employer]ron[employer] for birth control     Call if you want surgery       Dr. Orquidea Hardin, DO    Obstetrics and Gynecology  [facility] [place] [facility] [place]

## 2019-04-16 NOTE — LETTER
April 23, 2019      Silke Crawford  [address]  [facility] [address]    Dear MsAmrikTy,      I am happy to inform you that your recent cervical cancer screening test (PAP smear) was normal.      Preventative screenings such as this help to ensure your health for years to come. You should repeat a pap smear in 3 years, unless otherwise directed.      You will still need to return to the clinic every year for your annual exam and other preventive tests.     If you have additional questions regarding this result, please call our registered nurse, Dori at [phone].      Sincerely,      Orquidea Hardin DO/luz elena

## 2019-04-17 LAB
ALBUMIN SERPL-MCNC: 3.8 G/DL (ref 3.4–5)
ALP SERPL-CCNC: 82 U/L (ref 40–150)
ALT SERPL W P-5'-P-CCNC: 20 U/L (ref 0–50)
ANION GAP SERPL CALCULATED.3IONS-SCNC: 4 MMOL/L (ref 3–14)
AST SERPL W P-5'-P-CCNC: 13 U/L (ref 0–45)
BILIRUB SERPL-MCNC: 0.3 MG/DL (ref 0.2–1.3)
BUN SERPL-MCNC: 9 MG/DL (ref 7–30)
CALCIUM SERPL-MCNC: 9.4 MG/DL (ref 8.5–10.1)
CHLORIDE SERPL-SCNC: 108 MMOL/L (ref 94–109)
CHOLEST SERPL-MCNC: 206 MG/DL
CO2 SERPL-SCNC: 26 MMOL/L (ref 20–32)
CREAT SERPL-MCNC: 0.66 MG/DL (ref 0.52–1.04)
GFR SERPL CREATININE-BSD FRML MDRD: >90 ML/MIN/{1.73_M2}
GLUCOSE SERPL-MCNC: 84 MG/DL (ref 70–99)
HDLC SERPL-MCNC: 34 MG/DL
LDLC SERPL CALC-MCNC: 153 MG/DL
NONHDLC SERPL-MCNC: 172 MG/DL
POTASSIUM SERPL-SCNC: 4.8 MMOL/L (ref 3.4–5.3)
PROT SERPL-MCNC: 7.7 G/DL (ref 6.8–8.8)
SODIUM SERPL-SCNC: 138 MMOL/L (ref 133–144)
TRIGL SERPL-MCNC: 95 MG/DL
TSH SERPL DL<=0.005 MIU/L-ACNC: 1.31 MU/L (ref 0.4–4)

## 2019-04-18 LAB
C TRACH DNA SPEC QL NAA+PROBE: NEGATIVE
N GONORRHOEA DNA SPEC QL NAA+PROBE: NEGATIVE
SPECIMEN SOURCE: NORMAL
SPECIMEN SOURCE: NORMAL

## 2019-04-19 LAB
COPATH REPORT: NORMAL
PAP: NORMAL

## 2019-05-01 ENCOUNTER — OFFICE VISIT (OUTPATIENT)
Dept: URGENT CARE | Facility: URGENT CARE | Age: 24
End: 2019-05-01
Payer: COMMERCIAL

## 2019-05-01 VITALS
WEIGHT: 184.4 LBS | HEART RATE: 90 BPM | SYSTOLIC BLOOD PRESSURE: 100 MMHG | TEMPERATURE: 98.1 F | DIASTOLIC BLOOD PRESSURE: 66 MMHG | OXYGEN SATURATION: 100 % | BODY MASS INDEX: 36.01 KG/M2

## 2019-05-01 DIAGNOSIS — L30.4 INTERTRIGO: ICD-10-CM

## 2019-05-01 DIAGNOSIS — J02.9 SORE THROAT: Primary | ICD-10-CM

## 2019-05-01 DIAGNOSIS — H65.192 ACUTE OTITIS MEDIA WITH EFFUSION OF LEFT EAR: ICD-10-CM

## 2019-05-01 LAB
DEPRECATED S PYO AG THROAT QL EIA: NORMAL
SPECIMEN SOURCE: NORMAL

## 2019-05-01 PROCEDURE — 87880 STREP A ASSAY W/OPTIC: CPT | Performed by: FAMILY MEDICINE

## 2019-05-01 PROCEDURE — 87081 CULTURE SCREEN ONLY: CPT | Performed by: PHYSICIAN ASSISTANT

## 2019-05-01 PROCEDURE — 99214 OFFICE O/P EST MOD 30 MIN: CPT | Performed by: PHYSICIAN ASSISTANT

## 2019-05-01 RX ORDER — AMOXICILLIN 875 MG
875 TABLET ORAL 2 TIMES DAILY
Qty: 14 TABLET | Refills: 0 | Status: SHIPPED | OUTPATIENT
Start: 2019-05-01 | End: 2019-06-06

## 2019-05-01 RX ORDER — NYSTATIN 100000 [USP'U]/G
POWDER TOPICAL 3 TIMES DAILY
Qty: 45 G | Refills: 0 | Status: SHIPPED | OUTPATIENT
Start: 2019-05-01 | End: 2019-06-06

## 2019-05-01 NOTE — LETTER
[facility]  [address]  [address]  [facility] [address]  Phone: [phone]  Fax: [phone]    May 1, 2019        Silke Crawford  [address] [address]          To whom it may concern:    RE: Silke Perkinsstan    Patient was seen and treated today at our clinic and missed work.    Please contact me for questions or concerns.      Sincerely,        Zach Long PA-C

## 2019-05-01 NOTE — PATIENT INSTRUCTIONS
With distilled water 2-3x per day for a minimum 2-3 days  Mucinex, increased fluids, humidifier at night, steaming in the day  Cough drops and hot saltwater gargles as needed    Adult Self-Care for Colds  Colds are caused by viruses. They can't be cured with antibiotics. However, you can ease symptoms and support your body's efforts to heal itself.  No matter which symptoms you have, be sure to:    Drink plenty of fluids (water or clear soup)    Stop smoking and drinking alcohol    Get plenty of rest    Understand a fever    Take your temperature several times a day. If your fever is 100.4 F (38.0 C) for more than a day, call your healthcare provider.    Relax, lie down. Go to bed if you want. Just get off your feet and rest. Also, drink plenty of fluids to avoid dehydration.    Take acetaminophen or a nonsteroidal anti-inflammatory agent (NSAID), such as ibuprofen.  Treat a troubled nose kindly    Breathe steam or heated humidified air to open blocked nasal passages.  a hot shower or use a vaporizer. Be careful not to get burned by the steam.    Saline nasal sprays and decongestant tablets help open a stuffy nose. Antihistamines can also help, but they can cause side effects such as drowsiness and drying of the eyes, nose, and mouth.  Soothe a sore throat and cough    Gargle every 2 hours with 1/4 teaspoon of salt dissolved in 1/2 cup of warm water. Suck on throat lozenges and cough drops to moisten your throat.    Cough medicines are available but it is unclear how well they actually work.    Take acetaminophen or an NSAID, such as ibuprofen, to ease throat pain  Ease digestive problems    Put fluids back into your body. Take frequent sips of clear liquids such as water or broth. Avoid drinks that have a lot of sugar in them, such as juices and sodas. These can make diarrhea worse. Older children and adults can drink sports drinks.    As your appetite returns, you can resume your normal diet. Ask your  healthcare provider if there are any foods you should avoid.  When to seek medical care  When you first notice symptoms, ask your healthcare provider if antiviral medicines are appropriate. Antibiotics should not be taken for colds or flu. Also, call your healthcare provider if you have any of the following symptoms or if you aren't feeling better after 7 days:    Shortness of breath    Pain or pressure in the chest or belly (abdomen)    Worsening symptoms, especially after a period of improvement    Fever of 100.4 F  (38.0 C) or higher, or fever that doesn't go down with medicine    Sudden dizziness or confusion    Severe or continued vomiting    Signs of dehydration, including extreme thirst, dark urine, infrequent urination, dry mouth    Spotted, red, or very sore throat   Date Last Reviewed: 12/1/2016 2000-2017 [employer]. [address] [place] [address]. All rights reserved. This information is not intended as a substitute for professional medical care. Always follow your healthcare professional's instructions.      Patient Education     Otitis Media (Middle-Ear Infection) in Adults  Otitis media is another name for a middle-ear infection. It means an infection behind your eardrum. This kind of ear infection can happen after any condition that keeps fluid from draining from the middle ear. These conditions include allergies, a cold, a sore throat, or a respiratory infection.  Middle-ear infections are common in children, but they can also happen in adults. An ear infection in an adult may mean a more serious problem than in a child. So you may need additional tests. If you have an ear infection, you should see your health care provider for treatment.  What are the types of middle-ear infections?  Infections can affect the middle ear in several ways. They are:    Acute otitis media. This middle-ear infection occurs suddenly. It causes swelling and redness. Fluid and mucus become  trapped inside the ear. You can have a fever and ear pain.    Otitis media with effusion. Fluid (effusion) and mucus build up in the middle ear after the infection goes away. You may feel like your middle ear is full. This can continue for months and may affect your hearing.    Chronic otitis media with effusion. Fluid (effusion) remains in the middle ear for a long time. Or it builds up again and again, even though there is no infection. This type of middle-ear infection may be hard to treat. It may also affect your hearing.  Who is more likely to get a middle-ear infection?  You are more likely to get an ear infection if you:    Smoke or are around someone who smokes    Have seasonal or year-round allergy symptoms    Have a cold or other upper respiratory infection  What causes a middle-ear infection?  The middle ear connects to the throat by a canal called the eustachian tube. This tube helps even out the pressure between the outer ear and the inner ear. A cold or allergy can irritate the tube or cause the area around it to swell. This can keep fluid from draining from the middle ear. The fluid builds up behind the eardrum. Bacteria and viruses can grow in this fluid. The bacteria and viruses cause the middle-ear infection.  What are the symptoms of a middle-ear infection?  Common symptoms of a middle-ear infection in adults are:    Pain in 1 or both ears    Drainage from the ear    Muffled hearing    Sore throat   You may also have a fever. Rarely, your balance can be affected.  These symptoms may be the same as for other conditions. It s important to talk with your health care provider if you think you have a middle-ear infection. If you have a high fever, severe pain behind your ear, or paralysis in your face, see your provider as soon as you can.  How is a middle-ear infection diagnosed?  Your health care provider will take a medical history and do a physical exam. He or she will look at the outer ear and  eardrum with an otoscope. The otoscope is a lighted tool that lets your provider see inside the ear. A pneumatic otoscope blows a puff of air into the ear to check how well your eardrum moves. If you eardrum doesn t move well, it may mean you have fluid behind it.  Your provider may also do a test called tympanometry. This test tells how well the middle ear is working. It can find any changes in pressure in the middle ear. Your provider may test your hearing with a tuning fork.  How is a middle-ear infection treated?  A middle-ear infection may be treated with:    Antibiotics, taken by mouth or as ear drops    Medication for pain    Decongestants, antihistamines, or nasal steroids  Your health care provider may also have you try autoinsufflation. This helps adjust the air pressure in your ear. For this, you pinch your nose and gently exhale. This forces air back through the eustachian tube.  The exact treatment for your ear infection will depend on the type of infection you have. In general, if your symptoms don t get better in 48 to 72 hours, contact your health care provider.  Middle-ear infections can cause long-term problems if not treated. They can lead to:    Infection in other parts of the head    Permanent hearing loss    Paralysis of a nerve in your face  If you have a middle-ear infection that doesn t get better, you may need to see an ear, nose, and throat specialist (otolaryngologist). You may need a CT scan or MRI to check for head and neck cancer.  Ear tubes  Sometimes fluid stays in the middle ear even after you take antibiotics and the infection goes away. In this case, your health care provider may suggest that a small tube be placed in your ear. The tube is put at the opening of the eardrum. The tube keeps fluid from building up and relieves pressure in the middle ear. It can also help you hear better. This surgery is called myringotomy. It is not often done in adults.  The tubes usually fall out  on their own after 6 months to a year.    2449-1173 [employer]. [address] [place] [address], [place] [address]. All rights reserved. This information is not intended as a substitute for professional medical care. Always follow your healthcare professional's instructions.           Patient Education     Candida Skin Infection (Adult)  Candida is type of yeast. It grows naturally on the skin and in the mouth. If it grows out of control, it can cause an infection. Candida can cause infections in the genital area, skin folds, in the mouth, and under the breasts. Anyone can get this infection. It is more common in a person with a weak immune system, such as from diabetes, HIV, or cancer. It s also more common in someone who has been on antibiotic therapy. And it s more common people who are overweight or who have incontinence. Wearing tight-fitting clothing and taking part in activities with lots of skin-to-skin contact can also put you at risk.  Candida causes the skin to become bright red and inflamed. The border of the infected part of the skin is often raised. The infection causes pain and itching. Sometimes the skin peels and bleeds. In the mouth, candida is called thrush, and may cause white thickened areas.  A Candida rash is most often treated with an antifungal cream or ointment. The rash will clear a few days after starting the medicine. Infections that don t go away may need a prescription medicine. In rare cases, a bacterial infection can also occur.  Home care  Your healthcare provider will recommend an antifungal cream or ointment for the rash. He or she may also prescribe a medicine for the itch. Follow all instructions for using these medicines. Don t use cornstarch powder. Cornstarch can cause the Candida infection to get worse.  General care:    Keep your skin clean by washing the area twice a day.    Use the cream as directed until your rash is gone. Once the skin has healed, keep it dry to  prevent another infection.     If you are overweight, talk with your healthcare provider about a plan to lose excess weight.    Avoid clothes that fit tightly.  Follow-up care  Follow up with your healthcare provider, or as advised. Your rash will clear in 7 to 14 days. Call your healthcare provider if the rash is not gone after 14 days.  When to seek medical advice  Call your healthcare provider right away if any of these occur:    Pain or redness that gets worse or spreads    Fluid coming from the skin    Yellow crusts on the skin    Fever of 100.4 F (38 C) or higher, or as directed by your healthcare provider  Date Last Reviewed: 9/1/2016 2000-2018 [employer]. [address] [place] [address], [place] [address]. All rights reserved. This information is not intended as a substitute for professional medical care. Always follow your healthcare professional's instructions.

## 2019-05-02 LAB
BACTERIA SPEC CULT: NORMAL
SPECIMEN SOURCE: NORMAL

## 2019-05-10 NOTE — PROGRESS NOTES
SUBJECTIVE:  Silke Crawford is a 24 year old female who presents to the clinic today for a rash.  Onset of rash was 3 week(s) ago.   Rash is gradual onset and worsening.  Location of the rash: breast.  Quality/symptoms of rash: itching   Symptoms are moderate and rash seems to be worsening.  Previous history of a similar rash? No  Recent exposure history: none known    Associated symptoms include: nothing.      Silke Crawford is also presenting with a chief complaint of runny nose, cough - non-productive and sore throat.  Onset of symptoms was 3 day(s) ago.  Course of illness is worsening.    Severity moderate  Current and Associated symptoms: ear pain left  Treatment measures tried include Tylenol/Ibuprofen.  Predisposing factors include None.    History reviewed. No pertinent past medical history.  Current Outpatient Medications   Medication Sig Dispense Refill     buPROPion (WELLBUTRIN XL) 300 MG 24 hr tablet Take 1 tablet (300 mg) by mouth every morning 90 tablet 3     Lactobacillus (PROBIOTIC ACIDOPHILUS PO)        nystatin (MYCOSTATIN) 799466 UNIT/GM external powder Apply topically 3 times daily for 10 days 45 g 0     Social History     Tobacco Use     Smoking status: Never Smoker     Smokeless tobacco: Never Used   Substance Use Topics     Alcohol use: Yes     Comment: 2-3 drinks per week       ROS:  Review of systems negative except as stated above.    OBJECTIVE:  /66   Pulse 90   Temp 98.1  F (36.7  C) (Oral)   Wt 83.6 kg (184 lb 6.4 oz)   SpO2 100%   BMI 36.01 kg/m    GENERAL APPEARANCE: healthy, alert and no distress  EYES: EOMI,  PERRL, conjunctiva clear  HENT: ear canals normal L TM bulging and erythematous.  Nose and mouth without ulcers, erythema or lesions  NECK: supple, nontender, no lymphadenopathy  RESP: lungs clear to auscultation - no rales, rhonchi or wheezes  CV: regular rates and rhythm, normal S1 S2, no murmur noted  ABDOMEN:  soft, nontender, no HSM or masses and bowel sounds  normal  NEURO: Normal strength and tone, sensory exam grossly normal,  normal speech and mentation  SKIN: candida intertrigo, bilateral breasts    Results for orders placed or performed in visit on 05/01/19   Rapid strep screen   Result Value Ref Range    Specimen Description Throat     Rapid Strep A Screen       NEGATIVE: No Group A streptococcal antigen detected by immunoassay, await culture report.   Beta strep group A culture   Result Value Ref Range    Specimen Description Throat     Culture Micro No beta hemolytic Streptococcus Group A isolated          ASSESSMENT:  (J02.9) Sore throat  (primary encounter diagnosis)  Comment: likely viral URI  Plan: Rapid strep screen, Beta strep group A culture      (L30.4) Intertrigo  Plan: nystatin (MYCOSTATIN) 352762 UNIT/GM external         powder      (H65.192) Acute otitis media with effusion of left ear  Plan: amoxicillin (AMOXIL) 875 MG tablet      Patient Instructions         With distilled water 2-3x per day for a minimum 2-3 days  Mucinex, increased fluids, humidifier at night, steaming in the day  Cough drops and hot saltwater gargles as needed    Adult Self-Care for Colds  Colds are caused by viruses. They can't be cured with antibiotics. However, you can ease symptoms and support your body's efforts to heal itself.  No matter which symptoms you have, be sure to:    Drink plenty of fluids (water or clear soup)    Stop smoking and drinking alcohol    Get plenty of rest    Understand a fever    Take your temperature several times a day. If your fever is 100.4 F (38.0 C) for more than a day, call your healthcare provider.    Relax, lie down. Go to bed if you want. Just get off your feet and rest. Also, drink plenty of fluids to avoid dehydration.    Take acetaminophen or a nonsteroidal anti-inflammatory agent (NSAID), such as ibuprofen.  Treat a troubled nose kindly    Breathe steam or heated humidified air to open blocked nasal passages.  a hot shower or  use a vaporizer. Be careful not to get burned by the steam.    Saline nasal sprays and decongestant tablets help open a stuffy nose. Antihistamines can also help, but they can cause side effects such as drowsiness and drying of the eyes, nose, and mouth.  Soothe a sore throat and cough    Gargle every 2 hours with 1/4 teaspoon of salt dissolved in 1/2 cup of warm water. Suck on throat lozenges and cough drops to moisten your throat.    Cough medicines are available but it is unclear how well they actually work.    Take acetaminophen or an NSAID, such as ibuprofen, to ease throat pain  Ease digestive problems    Put fluids back into your body. Take frequent sips of clear liquids such as water or broth. Avoid drinks that have a lot of sugar in them, such as juices and sodas. These can make diarrhea worse. Older children and adults can drink sports drinks.    As your appetite returns, you can resume your normal diet. Ask your healthcare provider if there are any foods you should avoid.  When to seek medical care  When you first notice symptoms, ask your healthcare provider if antiviral medicines are appropriate. Antibiotics should not be taken for colds or flu. Also, call your healthcare provider if you have any of the following symptoms or if you aren't feeling better after 7 days:    Shortness of breath    Pain or pressure in the chest or belly (abdomen)    Worsening symptoms, especially after a period of improvement    Fever of 100.4 F  (38.0 C) or higher, or fever that doesn't go down with medicine    Sudden dizziness or confusion    Severe or continued vomiting    Signs of dehydration, including extreme thirst, dark urine, infrequent urination, dry mouth    Spotted, red, or very sore throat   Date Last Reviewed: 12/1/2016 2000-2017 [email or web address] [address] [place] [address], [place] [address]. All rights reserved. This information is not intended as a substitute for professional medical care. Always  follow your healthcare professional's instructions.      Patient Education     Otitis Media (Middle-Ear Infection) in Adults  Otitis media is another name for a middle-ear infection. It means an infection behind your eardrum. This kind of ear infection can happen after any condition that keeps fluid from draining from the middle ear. These conditions include allergies, a cold, a sore throat, or a respiratory infection.  Middle-ear infections are common in children, but they can also happen in adults. An ear infection in an adult may mean a more serious problem than in a child. So you may need additional tests. If you have an ear infection, you should see your health care provider for treatment.  What are the types of middle-ear infections?  Infections can affect the middle ear in several ways. They are:    Acute otitis media. This middle-ear infection occurs suddenly. It causes swelling and redness. Fluid and mucus become trapped inside the ear. You can have a fever and ear pain.    Otitis media with effusion. Fluid (effusion) and mucus build up in the middle ear after the infection goes away. You may feel like your middle ear is full. This can continue for months and may affect your hearing.    Chronic otitis media with effusion. Fluid (effusion) remains in the middle ear for a long time. Or it builds up again and again, even though there is no infection. This type of middle-ear infection may be hard to treat. It may also affect your hearing.  Who is more likely to get a middle-ear infection?  You are more likely to get an ear infection if you:    Smoke or are around someone who smokes    Have seasonal or year-round allergy symptoms    Have a cold or other upper respiratory infection  What causes a middle-ear infection?  The middle ear connects to the throat by a canal called the eustachian tube. This tube helps even out the pressure between the outer ear and the inner ear. A cold or allergy can irritate the tube  or cause the area around it to swell. This can keep fluid from draining from the middle ear. The fluid builds up behind the eardrum. Bacteria and viruses can grow in this fluid. The bacteria and viruses cause the middle-ear infection.  What are the symptoms of a middle-ear infection?  Common symptoms of a middle-ear infection in adults are:    Pain in 1 or both ears    Drainage from the ear    Muffled hearing    Sore throat   You may also have a fever. Rarely, your balance can be affected.  These symptoms may be the same as for other conditions. It s important to talk with your health care provider if you think you have a middle-ear infection. If you have a high fever, severe pain behind your ear, or paralysis in your face, see your provider as soon as you can.  How is a middle-ear infection diagnosed?  Your health care provider will take a medical history and do a physical exam. He or she will look at the outer ear and eardrum with an otoscope. The otoscope is a lighted tool that lets your provider see inside the ear. A pneumatic otoscope blows a puff of air into the ear to check how well your eardrum moves. If you eardrum doesn t move well, it may mean you have fluid behind it.  Your provider may also do a test called tympanometry. This test tells how well the middle ear is working. It can find any changes in pressure in the middle ear. Your provider may test your hearing with a tuning fork.  How is a middle-ear infection treated?  A middle-ear infection may be treated with:    Antibiotics, taken by mouth or as ear drops    Medication for pain    Decongestants, antihistamines, or nasal steroids  Your health care provider may also have you try autoinsufflation. This helps adjust the air pressure in your ear. For this, you pinch your nose and gently exhale. This forces air back through the eustachian tube.  The exact treatment for your ear infection will depend on the type of infection you have. In general, if your  symptoms don t get better in 48 to 72 hours, contact your health care provider.  Middle-ear infections can cause long-term problems if not treated. They can lead to:    Infection in other parts of the head    Permanent hearing loss    Paralysis of a nerve in your face  If you have a middle-ear infection that doesn t get better, you may need to see an ear, nose, and throat specialist (otolaryngologist). You may need a CT scan or MRI to check for head and neck cancer.  Ear tubes  Sometimes fluid stays in the middle ear even after you take antibiotics and the infection goes away. In this case, your health care provider may suggest that a small tube be placed in your ear. The tube is put at the opening of the eardrum. The tube keeps fluid from building up and relieves pressure in the middle ear. It can also help you hear better. This surgery is called myringotomy. It is not often done in adults.  The tubes usually fall out on their own after 6 months to a year.    4013-8913 [employer]. [address] [place] [address]. All rights reserved. This information is not intended as a substitute for professional medical care. Always follow your healthcare professional's instructions.           Patient Education     Candida Skin Infection (Adult)  Candida is type of yeast. It grows naturally on the skin and in the mouth. If it grows out of control, it can cause an infection. Candida can cause infections in the genital area, skin folds, in the mouth, and under the breasts. Anyone can get this infection. It is more common in a person with a weak immune system, such as from diabetes, HIV, or cancer. It s also more common in someone who has been on antibiotic therapy. And it s more common people who are overweight or who have incontinence. Wearing tight-fitting clothing and taking part in activities with lots of skin-to-skin contact can also put you at risk.  Candida causes the skin to become bright red and  inflamed. The border of the infected part of the skin is often raised. The infection causes pain and itching. Sometimes the skin peels and bleeds. In the mouth, candida is called thrush, and may cause white thickened areas.  A Candida rash is most often treated with an antifungal cream or ointment. The rash will clear a few days after starting the medicine. Infections that don t go away may need a prescription medicine. In rare cases, a bacterial infection can also occur.  Home care  Your healthcare provider will recommend an antifungal cream or ointment for the rash. He or she may also prescribe a medicine for the itch. Follow all instructions for using these medicines. Don t use cornstarch powder. Cornstarch can cause the Candida infection to get worse.  General care:    Keep your skin clean by washing the area twice a day.    Use the cream as directed until your rash is gone. Once the skin has healed, keep it dry to prevent another infection.     If you are overweight, talk with your healthcare provider about a plan to lose excess weight.    Avoid clothes that fit tightly.  Follow-up care  Follow up with your healthcare provider, or as advised. Your rash will clear in 7 to 14 days. Call your healthcare provider if the rash is not gone after 14 days.  When to seek medical advice  Call your healthcare provider right away if any of these occur:    Pain or redness that gets worse or spreads    Fluid coming from the skin    Yellow crusts on the skin    Fever of 100.4 F (38 C) or higher, or as directed by your healthcare provider  Date Last Reviewed: 9/1/2016 2000-2018 [employer]. [address] [place] [address], [place] [address]. All rights reserved. This information is not intended as a substitute for professional medical care. Always follow your healthcare professional's instructions.

## 2019-05-13 ENCOUNTER — TELEPHONE (OUTPATIENT)
Dept: OBGYN | Facility: CLINIC | Age: 24
End: 2019-05-13

## 2019-05-13 DIAGNOSIS — L91.8 SKIN TAG: Primary | ICD-10-CM

## 2019-05-13 NOTE — TELEPHONE ENCOUNTER
Patient called and would like to schedule surgery for the removal of a skin tag. Please forward surgery scheduling orders.

## 2019-05-14 NOTE — TELEPHONE ENCOUNTER
Procedure name(s) - multi select skin tags removal    Reason for procedure large skin tags    Surgeon: Wilfred    Is this a multi surgeon case? No    Laterality N/A    Request for additional equipment Other (see comments) None   Anesthesia General    Initiate Pre-op orders for above procedure: Yes, as ordered in Epic Additional orders noted there also   Location of Case: [facility]    Surgeon Procedure Time (incision to closure) in minutes (per procedure as applicable) 30    Note:  Surgical Case Time Needed (in minutes)   Date of Surgery (Requested): 5/14/2019 next available, non-urgent   Patient Class (for admit prior to surgery, specify number of days in comments): Same day (hospital outpatient)    Why can t this outpatient surgery be done at the [facility] or  ASC? na    H&P To Be Completed By: PCP    Post-Op Appointment 1.5 week    Bowel Prep: No    Vendor Needed? No

## 2019-05-15 ENCOUNTER — HOSPITAL ENCOUNTER (OUTPATIENT)
Facility: CLINIC | Age: 24
End: 2019-05-15
Attending: FAMILY MEDICINE | Admitting: FAMILY MEDICINE
Payer: COMMERCIAL

## 2019-05-15 NOTE — TELEPHONE ENCOUNTER
Type of surgery: skin tag removal  Location of surgery: [facility]  Date and time of surgery: 6/13/19 @ 7:30 am  Surgeon: Dr. Hardin  Pre-Op Appt Date: 6/11/19  Post-Op Appt Date: 6/25/19   Packet sent out: Yes  Pre-cert/Authorization completed:  No  Date: 5/15/19

## 2019-06-06 VITALS — WEIGHT: 180 LBS | HEIGHT: 60 IN | BODY MASS INDEX: 35.34 KG/M2

## 2019-06-06 RX ORDER — ACETAMINOPHEN 325 MG/1
975 TABLET ORAL ONCE
Status: CANCELLED | OUTPATIENT
Start: 2019-06-06 | End: 2019-06-06

## 2019-06-06 RX ORDER — KETOROLAC TROMETHAMINE 30 MG/ML
30 INJECTION, SOLUTION INTRAMUSCULAR; INTRAVENOUS ONCE
Status: CANCELLED | OUTPATIENT
Start: 2019-06-06 | End: 2019-06-06

## 2019-06-06 RX ORDER — LORATADINE 10 MG/1
10 TABLET ORAL EVERY EVENING
COMMUNITY
End: 2019-06-12 | Stop reason: HOSPADM

## 2019-06-06 ASSESSMENT — MIFFLIN-ST. JEOR: SCORE: 1474

## 2021-09-20 ENCOUNTER — OFFICE VISIT (OUTPATIENT)
Dept: SLEEP MEDICINE | Facility: CLINIC | Age: 26
End: 2021-09-20
Payer: COMMERCIAL

## 2021-09-20 VITALS
DIASTOLIC BLOOD PRESSURE: 82 MMHG | OXYGEN SATURATION: 98 % | SYSTOLIC BLOOD PRESSURE: 121 MMHG | HEART RATE: 89 BPM | WEIGHT: 207 LBS | HEIGHT: 60 IN | RESPIRATION RATE: 16 BRPM | BODY MASS INDEX: 40.64 KG/M2

## 2021-09-20 DIAGNOSIS — G47.30 OBSERVED SLEEP APNEA: ICD-10-CM

## 2021-09-20 DIAGNOSIS — R06.83 SNORING: ICD-10-CM

## 2021-09-20 DIAGNOSIS — R29.818 SUSPECTED SLEEP APNEA: Primary | ICD-10-CM

## 2021-09-20 DIAGNOSIS — G47.19 EXCESSIVE DAYTIME SLEEPINESS: ICD-10-CM

## 2021-09-20 DIAGNOSIS — E66.01 MORBID OBESITY (H): ICD-10-CM

## 2021-09-20 PROCEDURE — 99204 OFFICE O/P NEW MOD 45 MIN: CPT | Performed by: INTERNAL MEDICINE

## 2021-09-20 ASSESSMENT — MIFFLIN-ST. JEOR: SCORE: 1586.48

## 2021-09-20 NOTE — NURSING NOTE
"Chief Complaint   Patient presents with     Sleep Problem     Falling asleep all the time, Loud snoring       Initial /82   Pulse 89   Resp 16   Ht 1.518 m (4' 11.75\")   Wt 93.9 kg (207 lb)   SpO2 98%   BMI 40.77 kg/m   Estimated body mass index is 40.77 kg/m  as calculated from the following:    Height as of this encounter: 1.518 m (4' 11.75\").    Weight as of this encounter: 93.9 kg (207 lb).    Medication Reconciliation: complete  ESS 23  Neck circumference: 40centimeters.  Mellisa Blackmon CMA    "

## 2021-09-20 NOTE — PROGRESS NOTES
Sleep Consultation:    Date on this visit: 9/20/2021    Silke Crawford is sent by No ref. provider found for a sleep consultation regarding possible sleep apnea.    Primary Physician: Shira Bustillos     Silke Crawford reports nightly snoring and poor quality of sleep and daytime fatigue/sleepiness for last 1 year.     Her medical history is significant for depression.     Silke does snore every night. Patient does have a regular bed partner. There is report of choking, snorting and poor quality of sleep.  She does have witnessed apneas. They frequently sleep separately.  Patient sleeps on her side. She has occasional restless legs, denies no morning headaches. Silke has occasional sleep talking and denies any sleep walking, dream enactment and hypnogogic/hypnopompic hallucinations. She has experienced rare episodes of sleep paralysis.     Silke goes to sleep at 10:00 PM during the week. She wakes up at 7:00 AM with an alarm. She falls asleep in 5 minutes.  Silke denies difficulty falling asleep.  She wakes up 2 times a night for 5 minutes before falling back to sleep.  Silke wakes up to go to the bathroom.  On weekends, Silke goes to sleep at 11:00 PM.  She wakes up at 9:00 AM without an alarm. She falls asleep in 5 minutes.  Patient gets an average of 9 hours of sleep per night.     Silke denies difficulty breathing through her nose.      Patient's [place] Sleepiness score 23/24 consistent with excessive  daytime sleepiness.      Silke naps 1-2 times per week for 20 minutes to 2 hiurs, feels refreshed after naps. She takes some inadvertant naps.  She admits closing eyes while driving on longer drives. She has rather short commute to work and doesnot experience drowsiness during drive to work. Patient was counseled on the importance of driving while alert, to pull over if drowsy, or nap before getting into the vehicle if sleepy.      She uses 2 cups/day of coffee, 1 energy drinks/day. Last  caffeine intake is usually before 4 PM.    Allergies:    Allergies   Allergen Reactions     Augmentin Hives     Estrogens Other (See Comments)     MD told her not to take.       Medications:    Current Outpatient Medications   Medication Sig Dispense Refill     buPROPion (WELLBUTRIN XL) 300 MG 24 hr tablet Take 1 tablet (300 mg) by mouth every morning 90 tablet 3     Lactobacillus (PROBIOTIC ACIDOPHILUS PO) Take by mouth daily as needed          Problem List:  There are no problems to display for this patient.       Past Medical/Surgical History:  Past Medical History:   Diagnosis Date     Seasonal depression (H)      Past Surgical History:   Procedure Laterality Date     wisdom teeth extraction         Social History:  Social History     Socioeconomic History     Marital status: Single     Spouse name: Not on file     Number of children: Not on file     Years of education: Not on file     Highest education level: Not on file   Occupational History     Not on file   Tobacco Use     Smoking status: Never Smoker     Smokeless tobacco: Never Used   Substance and Sexual Activity     Alcohol use: Yes     Comment: 2-3 drinks per week     Drug use: No     Sexual activity: Yes     Partners: Male     Birth control/protection: Condom   Other Topics Concern     Parent/sibling w/ CABG, MI or angioplasty before 65F 55M? Not Asked   Social History Narrative     Not on file     Social Determinants of Health     Financial Resource Strain:      Difficulty of Paying Living Expenses:    Food Insecurity:      Worried About Running Out of Food in the Last Year:      Ran Out of Food in the Last Year:    Transportation Needs:      Lack of Transportation (Medical):      Lack of Transportation (Non-Medical):    Physical Activity:      Days of Exercise per Week:      Minutes of Exercise per Session:    Stress:      Feeling of Stress :    Social Connections:      Frequency of Communication with Friends and Family:      Frequency of Social  Gatherings with Friends and Family:      Attends [employer] Services:      Active Member of Clubs or Organizations:      Attends Club or Organization Meetings:      Marital Status:    Intimate Partner Violence:      Fear of Current or Ex-Partner:      Emotionally Abused:      Physically Abused:      Sexually Abused:        Family History:  Family History   Problem Relation Age of Onset     Melanoma Maternal Grandfather      Breast Cancer Maternal Grandmother      Father & sister have sleep apnea.     Review of Systems:  A complete review of systems reviewed by me is negative with the exeption of what has been mentioned in the history of present illness.    Physical Examination:  Vitals: There were no vitals taken for this visit.  BMI= Body mass index is 40.77 kg/m .         [place] Total Score 9/20/2021   Total score - [place] 23            GENERAL APPEARANCE: healthy, alert and no distress  EYES: Eyes grossly normal to inspection  RESP: lungs clear to auscultation - no rales, rhonchi or wheezes  CV: regular rates and rhythm  MS: extremities normal- no gross deformities noted  NEURO: mentation intact and speech normal  PSYCH: mentation appears normal and affect normal/bright  Mallampati Class: IV.  Tonsillar Stage: 1  hidden by pillars.    Impression/Plan:    1. Probable obstructive sleep apnea  2. Excessive daytime sleepiness   3. Obesity     Patient. 26 years old female, with BMI of 40, has a high risk for moderate to severe sleep apnea considering history of loud snoring, witnessed apneas & daytime sleepiness. An overnight sleep study is recommended for assessment.     Plan:     1. Home sleep apnea testing       She will follow up with me in approximately two weeks after her sleep study has been competed to review the results and discuss plan of care.       Polysomnography & HST reviewed.  Obstructive sleep apnea reviewed.  Complications of untreated sleep apnea were reviewed.    I spent a total of 45 minutes for  this appointment today which include time spent before, during and after the visit for patient care, counseling and coordination of care.    Dr. Elio Galindo     CC: No ref. provider found

## 2021-09-20 NOTE — PATIENT INSTRUCTIONS
Your BMI is Body mass index is 40.77 kg/m .  Weight management is a personal decision.  If you are interested in exploring weight loss strategies, the following discussion covers the approaches that may be successful. Body mass index (BMI) is one way to tell whether you are at a healthy weight, overweight, or obese. It measures your weight in relation to your height.  A BMI of 18.5 to 24.9 is in the healthy range. A person with a BMI of 25 to 29.9 is considered overweight, and someone with a BMI of 30 or greater is considered obese. More than two-thirds of American adults are considered overweight or obese.  Being overweight or obese increases the risk for further weight gain. Excess weight may lead to heart disease and diabetes.  Creating and following plans for healthy eating and physical activity may help you improve your health.  Weight control is part of healthy lifestyle and includes exercise, emotional health, and healthy eating habits. Careful eating habits lifelong are the mainstay of weight control. Though there are significant health benefits from weight loss, long-term weight loss with diet alone may be very difficult to achieve- studies show long-term success with dietary management in less than 10% of people. Attaining a healthy weight may be especially difficult to achieve in those with severe obesity. In some cases, medications, devices and surgical management might be considered.  What can you do?  If you are overweight or obese and are interested in methods for weight loss, you should discuss this with your provider.     Consider reducing daily calorie intake by 500 calories.     Keep a food journal.     Avoiding skipping meals, consider cutting portions instead.    Diet combined with exercise helps maintain muscle while optimizing fat loss. Strength training is particularly important for building and maintaining muscle mass. Exercise helps reduce stress, increase energy, and improves fitness.  Increasing exercise without diet control, however, may not burn enough calories to loose weight.       Start walking three days a week 10-20 minutes at a time    Work towards walking thirty minutes five days a week     Eventually, increase the speed of your walking for 1-2 minutes at time    In addition, we recommend that you review healthy lifestyles and methods for weight loss available through the National Institutes of Health patient information sites:  http://win.niddk.nih.gov/publications/index.htm    And look into health and wellness programs that may be available through your health insurance provider, employer, local community center, or [employer]nando club.    Weight management plan: Patient was referred to their PCP to discuss a diet and exercise plan.   Drive Safe... Drive Alive     Sleep health profoundly affects your health, mood, and your safety. 33% of the population (one in three of us) is not getting enough sleep and many have a sleep disorder. Not getting enough sleep or having an untreated / undertreated sleep condition may make us sleepy without even knowing it. In fact, our driving could be dramatically impaired due to our sleep health. As your provider, here are some things I would like you to know about driving:     Here are some warning signs for impairment and dangerous drowsy driving:              -Having been awake more than 16 hours               -Looking tired               -Eyelid drooping              -Head nodding (it could be too late at this point)              -Driving for more than 30 minutes     Some things you could do to make the driving safer if you are experiencing some drowsiness:              -Stop driving and rest              -Call for transportation              -Make sure your sleep disorder is adequately treated     Some things that have been shown NOT to work when experiencing drowsiness while driving:              -Turning on the radio              -Opening windows               -Eating any  distracting  /  entertaining  foods (e.g., sunflower seeds, candy, or any other)              -Talking on the phone      Your decision may not only impact your life, but also the life of others. Please, remember to drive safe for yourself and all of us.    Elio Galindo MD, MD

## 2021-10-03 ENCOUNTER — HEALTH MAINTENANCE LETTER (OUTPATIENT)
Age: 26
End: 2021-10-03

## 2021-11-04 LAB
FLUAV RNA SPEC QL NAA+PROBE: NEGATIVE
FLUBV RNA RESP QL NAA+PROBE: NEGATIVE
SARS-COV-2 RNA RESP QL NAA+PROBE: NEGATIVE

## 2021-11-04 PROCEDURE — 87636 SARSCOV2 & INF A&B AMP PRB: CPT | Performed by: EMERGENCY MEDICINE

## 2021-11-04 PROCEDURE — 250N000013 HC RX MED GY IP 250 OP 250 PS 637: Performed by: EMERGENCY MEDICINE

## 2021-11-04 PROCEDURE — 93005 ELECTROCARDIOGRAM TRACING: CPT

## 2021-11-04 PROCEDURE — 99285 EMERGENCY DEPT VISIT HI MDM: CPT | Mod: 25

## 2021-11-04 PROCEDURE — C9803 HOPD COVID-19 SPEC COLLECT: HCPCS

## 2021-11-04 RX ORDER — ACETAMINOPHEN 325 MG/1
650 TABLET ORAL ONCE
Status: COMPLETED | OUTPATIENT
Start: 2021-11-04 | End: 2021-11-04

## 2021-11-04 RX ADMIN — ACETAMINOPHEN 650 MG: 325 TABLET, FILM COATED ORAL at 23:13

## 2021-11-05 ENCOUNTER — APPOINTMENT (OUTPATIENT)
Dept: GENERAL RADIOLOGY | Facility: CLINIC | Age: 26
End: 2021-11-05
Attending: EMERGENCY MEDICINE
Payer: COMMERCIAL

## 2021-11-05 ENCOUNTER — HOSPITAL ENCOUNTER (EMERGENCY)
Facility: CLINIC | Age: 26
Discharge: HOME | End: 2021-11-05
Attending: EMERGENCY MEDICINE | Admitting: EMERGENCY MEDICINE
Payer: COMMERCIAL

## 2021-11-05 VITALS
TEMPERATURE: 99.7 F | WEIGHT: 205 LBS | SYSTOLIC BLOOD PRESSURE: 122 MMHG | OXYGEN SATURATION: 100 % | RESPIRATION RATE: 16 BRPM | DIASTOLIC BLOOD PRESSURE: 67 MMHG | HEART RATE: 84 BPM | BODY MASS INDEX: 40.37 KG/M2

## 2021-11-05 DIAGNOSIS — R00.2 PALPITATIONS: ICD-10-CM

## 2021-11-05 DIAGNOSIS — J06.9 VIRAL URI WITH COUGH: ICD-10-CM

## 2021-11-05 LAB
ALBUMIN SERPL-MCNC: 3.4 G/DL (ref 3.4–5)
ALP SERPL-CCNC: 85 U/L (ref 40–150)
ALT SERPL W P-5'-P-CCNC: 35 U/L (ref 0–50)
ANION GAP SERPL CALCULATED.3IONS-SCNC: 4 MMOL/L (ref 3–14)
AST SERPL W P-5'-P-CCNC: 19 U/L (ref 0–45)
ATRIAL RATE - MUSE: 97 BPM
B-HCG SERPL-ACNC: <1 IU/L (ref 0–5)
BASOPHILS # BLD AUTO: 0.1 10E3/UL (ref 0–0.2)
BASOPHILS NFR BLD AUTO: 0 %
BILIRUB SERPL-MCNC: 0.2 MG/DL (ref 0.2–1.3)
BUN SERPL-MCNC: 15 MG/DL (ref 7–30)
CALCIUM SERPL-MCNC: 8.4 MG/DL (ref 8.5–10.1)
CHLORIDE BLD-SCNC: 108 MMOL/L (ref 94–109)
CO2 SERPL-SCNC: 24 MMOL/L (ref 20–32)
CREAT SERPL-MCNC: 0.71 MG/DL (ref 0.52–1.04)
D DIMER PPP FEU-MCNC: 0.42 UG/ML FEU (ref 0–0.5)
DIASTOLIC BLOOD PRESSURE - MUSE: NORMAL MMHG
EOSINOPHIL # BLD AUTO: 0 10E3/UL (ref 0–0.7)
EOSINOPHIL NFR BLD AUTO: 0 %
ERYTHROCYTE [DISTWIDTH] IN BLOOD BY AUTOMATED COUNT: 13.3 % (ref 10–15)
GFR SERPL CREATININE-BSD FRML MDRD: >90 ML/MIN/1.73M2
GLUCOSE BLD-MCNC: 98 MG/DL (ref 70–99)
HCT VFR BLD AUTO: 35.3 % (ref 35–47)
HGB BLD-MCNC: 11.1 G/DL (ref 11.7–15.7)
IMM GRANULOCYTES # BLD: 0.1 10E3/UL
IMM GRANULOCYTES NFR BLD: 1 %
INTERPRETATION ECG - MUSE: NORMAL
LYMPHOCYTES # BLD AUTO: 1.5 10E3/UL (ref 0.8–5.3)
LYMPHOCYTES NFR BLD AUTO: 11 %
MCH RBC QN AUTO: 25.1 PG (ref 26.5–33)
MCHC RBC AUTO-ENTMCNC: 31.4 G/DL (ref 31.5–36.5)
MCV RBC AUTO: 80 FL (ref 78–100)
MONOCYTES # BLD AUTO: 1 10E3/UL (ref 0–1.3)
MONOCYTES NFR BLD AUTO: 7 %
NEUTROPHILS # BLD AUTO: 11.1 10E3/UL (ref 1.6–8.3)
NEUTROPHILS NFR BLD AUTO: 81 %
NRBC # BLD AUTO: 0 10E3/UL
NRBC BLD AUTO-RTO: 0 /100
P AXIS - MUSE: 28 DEGREES
PLATELET # BLD AUTO: 409 10E3/UL (ref 150–450)
POTASSIUM BLD-SCNC: 3.6 MMOL/L (ref 3.4–5.3)
PR INTERVAL - MUSE: 146 MS
PROT SERPL-MCNC: 7.7 G/DL (ref 6.8–8.8)
QRS DURATION - MUSE: 80 MS
QT - MUSE: 354 MS
QTC - MUSE: 449 MS
R AXIS - MUSE: 52 DEGREES
RBC # BLD AUTO: 4.43 10E6/UL (ref 3.8–5.2)
SODIUM SERPL-SCNC: 136 MMOL/L (ref 133–144)
SYSTOLIC BLOOD PRESSURE - MUSE: NORMAL MMHG
T AXIS - MUSE: 56 DEGREES
TROPONIN I SERPL-MCNC: <0.015 UG/L (ref 0–0.04)
VENTRICULAR RATE- MUSE: 97 BPM
WBC # BLD AUTO: 13.7 10E3/UL (ref 4–11)

## 2021-11-05 PROCEDURE — 258N000003 HC RX IP 258 OP 636: Performed by: EMERGENCY MEDICINE

## 2021-11-05 PROCEDURE — 96361 HYDRATE IV INFUSION ADD-ON: CPT

## 2021-11-05 PROCEDURE — 80053 COMPREHEN METABOLIC PANEL: CPT | Performed by: EMERGENCY MEDICINE

## 2021-11-05 PROCEDURE — 84702 CHORIONIC GONADOTROPIN TEST: CPT | Performed by: EMERGENCY MEDICINE

## 2021-11-05 PROCEDURE — 96360 HYDRATION IV INFUSION INIT: CPT

## 2021-11-05 PROCEDURE — 85025 COMPLETE CBC W/AUTO DIFF WBC: CPT | Performed by: EMERGENCY MEDICINE

## 2021-11-05 PROCEDURE — 84484 ASSAY OF TROPONIN QUANT: CPT | Performed by: EMERGENCY MEDICINE

## 2021-11-05 PROCEDURE — 36415 COLL VENOUS BLD VENIPUNCTURE: CPT | Performed by: EMERGENCY MEDICINE

## 2021-11-05 PROCEDURE — 85379 FIBRIN DEGRADATION QUANT: CPT | Performed by: EMERGENCY MEDICINE

## 2021-11-05 PROCEDURE — 71045 X-RAY EXAM CHEST 1 VIEW: CPT

## 2021-11-05 RX ADMIN — SODIUM CHLORIDE 1000 ML: 9 INJECTION, SOLUTION INTRAVENOUS at 01:26

## 2021-11-05 ASSESSMENT — ENCOUNTER SYMPTOMS
ABDOMINAL PAIN: 0
FEVER: 1
HEMATURIA: 0
VOMITING: 0
DYSURIA: 0
PALPITATIONS: 1
SORE THROAT: 1
CHILLS: 1
NAUSEA: 0
NERVOUS/ANXIOUS: 1
COUGH: 1
SHORTNESS OF BREATH: 1

## 2021-11-05 NOTE — DISCHARGE INSTRUCTIONS
Discharge Instructions  Upper Respiratory Infection    The upper respiratory tract includes the sinuses, nasal passages, pharynx, and larynx. A URI, or upper respiratory infection, is an infection of any of the parts of the upper airway. Symptoms include runny nose, congestion, sneezing, sore throat, cough, and fever. URIs are almost always caused by a virus. Antibiotics do not help with viral infections, so are generally not prescribed. A URI is very contagious through coughing and nasal secretions; make sure you wash your hands often and clean surfaces after sneezing, coughing or touching them. While you should start to improve in 3 - 5 days, remember that sometimes a cough can linger for several weeks.    Generally, every Emergency Department visit should have a follow-up clinic visit with either a primary or a specialty clinic/provider. Please follow-up as instructed by your emergency provider today.    Return to the Emergency Department if:  Any of your symptoms get much worse.  You seem very sick, like being too weak to get up.  You have chest pain or shortness of breath.   You have a severe headache.  You are vomiting (throwing up) so much you cannot keep fluids or medicines down.  You have confusion or seem unusually drowsy.  You have a seizure.    What can I do to help myself?  Fill any prescriptions the provider gave you and take them right away  If you have a fever, get plenty of rest and drink lots of fluids, especially water.  Using a humidifier or saline nose spray will also help loosen mucous.   Clothes or blankets will not change your fever. Do what is comfortable for you.  Bathing or sponging in lukewarm water may help you feel better.  Acetaminophen (Tylenol ) or ibuprofen (Advil , Motrin ) will help bring fever down and may help you feel more comfortable. Be sure to read and follow the package directions, and ask your provider if you have questions.  Do not drink alcohol.  Decongestants may help  you feel better. You may use decongestant nose sprays Afrin  (oxymetazoline) or Sawyer-Synephrine  (phenylephrine hydrochloride) for up to 3 days, or may use a decongestant tablet like Sudafed  (pseudoephedrine).  If you were given a prescription for medicine here today, be sure to read all of the information (including the package insert) that comes with your prescription.  This will include important information about the medicine, its side effects, and any warnings that you need to know about.  The pharmacist who fills the prescription can provide more information and answer questions you may have about the medicine.  If you have questions or concerns that the pharmacist cannot address, please call or return to the Emergency Department.   Remember that you can always come back to the Emergency Department if you are not able to see your regular provider in the amount of time listed above, if you get any new symptoms, or if there is anything that worries you.      Discharge Instructions  COVID-19    COVID-19 is the disease caused by a new coronavirus. The virus spreads from person-to-person primarily by droplets when an infected person coughs or sneezes and the droplets are then breathed in by another person. There are tests available to diagnose COVID-19. You may have been diagnosed with COVID, may be being tested for COVID and have a pending test result, or may have been exposed to COVID.    Symptoms of COVID-19  Many people have no symptoms or mild symptoms.  Symptoms may usually appear 4 to 5 days (up to 14 days) after contact with a person with COVID-19. Some people will get severe symptoms and pneumonia. Usual symptoms are:     ? Fever  ? Cough  ? Trouble breathing    Less common symptoms are: Headache, body aches, sore throat, sneezing, diarrhea, loss of taste or smell.    Isolation and Quarantine    You may have been seen because you have symptoms, had an exposure, or had some other concern about possible  COVID. The best way to stop the spread of the virus is to avoid contact with others.    Isolation refers to sick people staying away from people who are not sick. A person in quarantine is limiting activity because they were exposed and are waiting to see if they might become sick.    If you test positive for COVID, you should stay home (isolation) for at least 10 days after your symptoms began, and for 24 hours with no fever and improvement of symptoms--whichever is longer. (Your fever should be gone for 24 hours without using fever-reducing medicine). If you have no symptoms, you should stay home (isolation) for 10 days from the day of the test. If you have been vaccinated for COVID, the vaccination will not cause you to test positive so a positive test result generally is a  true positive .    For example, if you have a fever and cough for 6 days, you need to stay home 4 more days with no fever for a total of 10 days. Or, if you have a fever and cough for 10 days, you need to stay home one more day with no fever for a total of 11 days.    If you have a high-risk exposure to COVID (you spent 15 minutes or more within six feet of somebody who has COVID), you should stay home (quarantine) for 14 days, unless you are vaccinated. Even if you test negative for COVID, the CDC recommends a 14-day quarantine from the time of your last exposure to that individual (unless you are vaccinated). There are options for a shortened (<14 day quarantine) you can review at:  https://www.health.[facility].[place]/diseases/coronavirus/close.html#long    If you live in the same house as somebody with COVID and cannot separate from them, you will need to quarantine for 14-days after that person's isolation (infectious) period. That means that you may need to quarantine for 24-days after that person became symptomatic/ill.    If you are vaccinated and do not develop symptoms, you do not need to quarantine after exposure.    If you have symptoms  but a negative test, you should stay at home until you are symptom-free and without fever for 24 hours, using the same judgment you would for when it is safe to return to work/school from strep throat, influenza, or the common cold. If you worsen, you should consider being re-evaluated.    If you are being tested for COVID because of symptoms and your test is pending, you should stay home until you know your test result.    If I have COVID, how should I protect myself and others?    Do not go to work or school. Have a friend or relative do your shopping. Do not use public transportation (bus, train) or ridesharing (Lyft, Uber).    Separate yourself from other people in your home. As much as possible, you should stay in one room and away from other people in your home. Also, use a separate bathroom, if possible. Avoid handling pets or other animals while sick.     Wear a facemask if you need to be around other people and cover your mouth and nose with a tissue when you cough or sneeze.     Avoid sharing personal household items. You should not share dishes, drinking glasses, forks/knives/spoons, towels, or bedding with other people in your home. After using these items, they should be washed with soap and water. Clean parts of your home that are touched often (doorknobs, faucets, countertops, etc.) daily.     Wash your hands often with soap and water for at least 20 seconds or use an alcohol-based hand  containing at least 60% alcohol.     Avoid touching your face.    Treat your symptoms. You can take Acetaminophen (Tylenol) to treat body aches and fever as needed for comfort. Ibuprofen (Advil or Motrin) can be used as well if you still have symptoms after taking Tylenol. Drink fluids. Rest.    Watch for worsening symptoms such as shortness of breath/difficulty breathing or very severe weakness.    Employers/workplaces are being asked by the Centers for Disease Control (CDC) to not request  notes/documentation for you to return to work or prove that you were ill. You may choose to show your employer this paperwork. Also, repeat testing should not be required to return to work.    Exercise/Sports in rare cases, COVID could affect your heart in a way that makes exercise or participation in sports dangerous.    If you have a mild COVID illness (fever, cough, sore throat, and similar symptoms but no difficulty breathing or abnormalities of the lung): After your COVID symptoms have resolved, wait 14-days before returning to activity.  If you have more than a mild illness (meaning that you have problems with your breathing or lungs) or if you participate in competitive or strenuous activity or have a history of heart disease: Please see your primary doctor/provider prior to return to activity/competition.    Antibody treatments are available for patients with mild to moderate COVID illness in order to prevent severe illness. In general, only patients with risk factors for severe illness are eligible for treatment. For more information, to see if you are eligible, and to find treatment, go to the [facility]:  [email or web address].[place]/diseases/coronavirus/mnrap.html     Return to the Emergency Department if:    If you are developing worsening breathing, shortness of breath, or feel worse you should seek medical attention.  If you are uncertain, contact your health care provider/clinic. If you need emergency medical attention, call 911 and tell them you have been ill.

## 2021-11-05 NOTE — ED PROVIDER NOTES
History   Chief Complaint:  Shortness of Breath        The history is provided by the patient.      Silke Crawford is a 26 year old female who presents with shortness of breath. Her symptoms started last night including a fever, cough, sore throat, chills, mild shortness of breath and chest discomfort. The patient took DayQuil today and developed heart palpitations and anxiety, prompting her ER presentation. She denies having nausea, vomiting, diarrhea, or abdominal pain. No loss of taste or smell. She also denies any urinary symptoms. Patient is not vaccinated for covid.     Review of Systems   Constitutional: Positive for chills and fever.   HENT: Positive for sore throat.    Respiratory: Positive for cough and shortness of breath.    Cardiovascular: Positive for chest pain and palpitations.   Gastrointestinal: Negative for abdominal pain, nausea and vomiting.   Genitourinary: Negative for dysuria and hematuria.   Psychiatric/Behavioral: The patient is nervous/anxious.    All other systems reviewed and are negative.      Allergies:  Augmentin  Estrogens  Amoxicillin    Medications:  The patient is currently on no regular medications.     Past Medical History:     Seasonal depression  Obesity  Closed fracture 2nd finger middle phalanx right hand  Anxiety  Halitosis  Dysmenorrhea  Acute bronchitis  Varicella    Past Surgical History:    [place] teeth extraction     Family History:    Father: High cholesterol, hypertension  Mother: Gall stones.  Sister: Obesity, migraine, hypertension    Social History:  Patient presents alone  Light tobacco smoker    Physical Exam     Patient Vitals for the past 24 hrs:   BP Temp Temp src Pulse Resp SpO2 Weight   11/05/21 0253 122/67 -- -- 84 16 100 % --   11/05/21 0201 125/66 99.7  F (37.6  C) Oral 98 16 100 % --   11/04/21 2305 -- (!) 101.7  F (38.7  C) Oral -- -- -- --   11/04/21 2302 (!) 123/96 99.9  F (37.7  C) Oral (!) 133 16 98 % 93 kg (205 lb)       Physical Exam  Nursing  note and vitals reviewed.  Constitutional:  Oriented to person, place, and time. Cooperative.   HENT:   Nose:    Nose normal.   Mouth/Throat:   Mucous membranes are normal.   Eyes:    Conjunctivae normal and EOM are normal.      Pupils are equal, round, and reactive to light.   Neck:    Trachea normal.   Cardiovascular:  Tachycardic rate, regular rhythm, normal heart sounds and normal pulses. No murmur heard.  Pulmonary/Chest:  Effort normal and breath sounds normal.   Abdominal:   Soft. Normal appearance and bowel sounds are normal.      There is no tenderness.      There is no rebound and no CVA tenderness.   Musculoskeletal:  Extremities atraumatic x 4.   Lymphadenopathy:  No cervical adenopathy.   Neurological:   Alert and oriented to person, place, and time. Normal strength.      No cranial nerve deficit or sensory deficit. GCS eye subscore is 4. GCS verbal subscore is 5. GCS motor subscore is 6.   Skin:    Skin is intact. No rash noted.   Psychiatric:   Normal mood and affect.     Emergency Department Course   ECG  ECG obtained at 0127, ECG read at 0120  Normal sinus rhythm  Normal ECG   No significant change as compared to prior, dated 03/30/21.  Rate 97 bpm. ME interval 146 ms. QRS duration 80 ms. QT/QTc 354/449 ms. P-R-T axes 28 52 56.     Imaging:  XR Chest Port 1 View   Final Result   IMPRESSION: Heart size is normal. Lungs are clear. No pneumothorax.        Report per radiology    Laboratory:  Labs Ordered and Resulted from Time of ED Arrival to Time of ED Departure   COMPREHENSIVE METABOLIC PANEL - Abnormal       Result Value    Sodium 136      Potassium 3.6      Chloride 108      Carbon Dioxide (CO2) 24      Anion Gap 4      Urea Nitrogen 15      Creatinine 0.71      Calcium 8.4 (*)     Glucose 98      Alkaline Phosphatase 85      AST 19      ALT 35      Protein Total 7.7      Albumin 3.4      Bilirubin Total 0.2      GFR Estimate >90     CBC WITH PLATELETS AND DIFFERENTIAL - Abnormal    WBC Count 13.7  (*)     RBC Count 4.43      Hemoglobin 11.1 (*)     Hematocrit 35.3      MCV 80      MCH 25.1 (*)     MCHC 31.4 (*)     RDW 13.3      Platelet Count 409      % Neutrophils 81      % Lymphocytes 11      % Monocytes 7      % Eosinophils 0      % Basophils 0      % Immature Granulocytes 1      NRBCs per 100 WBC 0      Absolute Neutrophils 11.1 (*)     Absolute Lymphocytes 1.5      Absolute Monocytes 1.0      Absolute Eosinophils 0.0      Absolute Basophils 0.1      Absolute Immature Granulocytes 0.1 (*)     Absolute NRBCs 0.0     INFLUENZA A/B & SARS-COV2 PCR MULTIPLEX - Normal    Influenza A target Negative      Influenza B target Negative      SARS CoV2 PCR Negative     TROPONIN I - Normal    Troponin I <0.015     D DIMER QUANTITATIVE - Normal    D-Dimer Quantitative 0.42     HCG QUANTITATIVE PREGNANCY - Normal    hCG Quantitative <1          Emergency Department Course:  Reviewed:  I reviewed nursing notes, vitals, past medical history and Care Everywhere    Assessments:  0105 I obtained history and examined the patient as noted above.   0217 I rechecked the patient and explained findings.     Interventions:  2313 Tylenol 650 mg, PO  0126 0.9% sodium chloride BOLUS 1000 mL, IV    Disposition:  The patient was discharged to home.     Impression & Plan     Medical Decision Making:  This is a 26-year-old female who came in for further evaluation of URI symptoms, but mainly due to palpitations and anxiety after taking DayQuil.  She does not appear septic or toxic here, nor is she in respiratory distress.  She also had a normal lung exam and normal oxygen saturation levels.  Her Covid swab did come back negative, however I still cautioned her that she could have Covid despite the negative test and to isolate per CDC guidelines.  I felt it was reasonable to rule out other potential causes as well, including pneumonia, pulmonary embolism, cardiac arrhythmia, cardiac ischemia, and pregnancy.  With a negative D-dimer, I feel  that pulmonary embolism has been sufficiently ruled out.  I then obtained a chest x-ray, which was unremarkable as well.  Her WBC did come back elevated, but she otherwise does have a normal work-up and exam.  I reassured her that the palpitations are not worrisome and are likely PVCs or PACs.  She is to continue with rest as well as fluids and ibuprofen and Tylenol.  She knows to return with any concerns or worsening symptoms and to follow-up with her physician as needed.      Diagnosis:    ICD-10-CM    1. Viral URI with cough  J06.9    2. Palpitations  R00.2        Scribe Disclosure:  I, Jessy Boone (trainee) and Harmony Santos (), am serving as a scribe at 1:05 AM on 11/5/2021 to document services personally performed by Yan Livingston MD based on my observations and the provider's statements to me.            Yan Livingston MD  11/05/21 0576

## 2021-11-05 NOTE — LETTER
November 5, 2021      To Whom It May Concern:      Silke Crawford was seen in our Emergency Department today, 11/05/21.  Please excuse her significant other from work, Shahid Rios, from work today so that he can take care of her.    Sincerely,        Yan Livingston MD

## 2021-11-05 NOTE — ED TRIAGE NOTES
congestion, cough, fever since yesterday - took dayquil with acetaminophen at 1715. not vaccinated for covid. Pt reports she has had palpitations since taking dayquil

## 2021-11-30 ENCOUNTER — OFFICE VISIT (OUTPATIENT)
Dept: SLEEP MEDICINE | Facility: CLINIC | Age: 26
End: 2021-11-30
Payer: COMMERCIAL

## 2021-11-30 DIAGNOSIS — R06.83 SNORING: ICD-10-CM

## 2021-11-30 DIAGNOSIS — E66.01 MORBID OBESITY (H): ICD-10-CM

## 2021-11-30 DIAGNOSIS — R29.818 SUSPECTED SLEEP APNEA: ICD-10-CM

## 2021-11-30 DIAGNOSIS — G47.19 EXCESSIVE DAYTIME SLEEPINESS: ICD-10-CM

## 2021-11-30 DIAGNOSIS — G47.30 OBSERVED SLEEP APNEA: ICD-10-CM

## 2021-11-30 PROCEDURE — G0399 HOME SLEEP TEST/TYPE 3 PORTA: HCPCS | Performed by: INTERNAL MEDICINE

## 2021-12-01 ENCOUNTER — DOCUMENTATION ONLY (OUTPATIENT)
Dept: SLEEP MEDICINE | Facility: CLINIC | Age: 26
End: 2021-12-01
Payer: COMMERCIAL

## 2021-12-01 NOTE — PROGRESS NOTES
This HSAT was performed using a Noxturnal T3 device which recorded snore, sound, movement activity, body position, nasal pressure, oronasal thermal airflow, pulse, oximetry and both chest and abdominal respiratory effort. HSAT data was restricted to the time patient states they were in bed.     HSAT was scored using 1B 4% hypopnea rule.     HST AHI (Non-PAT): 120.8  Snoring was reported as loud.  Time with SpO2 below 89% was 198 minutes.   Overall signal quality was good.     Pt will follow up with sleep provider to determine appropriate therapy.     HST POST-STUDY QUESTIONNAIRE    1. What time did you go to bed?  11 p.m.  2. How long do you think it took to fall asleep?  1 1/2 hours  3. What time did you wake up to start the day?  8:45  4. Did you get up during the night at all?  yes  5. If you woke up, do you remember approximately what time(s)? I don't  6. Did you have any difficulty with the equipment?  No  7. Did you us any type of treatment with this study?  None  8. Was the head of the bed elevated? No  9. Did you sleep in a recliner?  No  10. Did you stop using CPAP at least 3 days before this test?  No  11. Any other information you'd like us to know? I had my sister's CPAP and I had it the night before.  I use it because I was falling asleep while driving and while at work, I couldn't function.

## 2021-12-02 PROBLEM — G47.33 OSA (OBSTRUCTIVE SLEEP APNEA): Status: ACTIVE | Noted: 2021-12-02

## 2021-12-02 NOTE — PROCEDURES
"HOME SLEEP STUDY INTERPRETATION    Patient: Silke Crawford  MRN: [id number]  YOB: 1995  Study Date: 2021  PCP/Referring Provider: Shira Bustillos;   Ordering Provider: Elio Galindo MD     Indications for Home Study: Silke Crawford is a 26 year old female with a history of depression & excessive daytime sleepiness who presents with symptoms suggestive of obstructive sleep apnea.    Estimated body mass index is 40.37 kg/m  as calculated from the following:    Height as of 21: 1.518 m (4' 11.75\").    Weight as of 21: 93 kg (205 lb).  Total score - [place]: 23 (2021  2:00 PM)  STOP-BAN/8    Data: A full night home sleep study was performed recording the standard physiologic parameters including body position, movement, sound, nasal pressure, thermal oral airflow, chest and abdominal movements with respiratory inductance plethysmography, and oxygen saturation by pulse oximetry. Pulse rate was estimated by oximetry recording. This study was considered adequate based on > 4 hours of quality oximetry and respiratory recording. As specified by the AASM Manual for the Scoring of Sleep and Associated events, version 2.3, Rule VIII.D 1B, 4% oxygen desaturation scoring for hypopneas is used as a standard of care on all home sleep apnea testing.    Analysis Time:  502.5 minutes    Respiration:   Sleep Associated Hypoxemia: sustained hypoxemia was present. Baseline oxygen saturation was 94%.  Time with saturation less than or equal to 88% was 198 minutes. The lowest oxygen saturation was 62%.   Snoring: Snoring was present.  Respiratory events: The home study revealed a presence of 691 obstructive apneas and 255 mixed and central apneas. There were 62 hypopneas resulting in a combined apnea/hypopnea index [AHI] of 120.8 events per hour.  AHI was 120.8 per hour supine, - per hour prone, 121.5 per hour on left side, and - per hour on right side.   Pattern: Excluding events noted " above, respiratory rate and pattern was Normal.    Position: Percent of time spent: supine - 85.9%, prone - 0%, on left - 13.7%, on right - 0%.    Heart Rate: By pulse oximetry normal rate was noted.     Assessment:   Severe obstructive sleep apnea.  Sleep associated hypoxemia was present.    Recommendations:  CPAP therapy is recommended for severe obstructive sleep apnea. Consider auto-CPAP at 7-17 cmH2O. Recommend clinical follow up to monitor therapy and review device download data. A follow up WatchPAT HST can be considered once established on therapy to follow up on hypoxemia.   Suggest optimizing sleep hygiene and avoiding sleep deprivation.  Weight management.    Diagnosis Code(s): Obstructive Sleep Apnea G47.33, Hypoxemia G47.36    Elio Galindo MD, December 2, 2021   Diplomate, American Board of Psychiatry and Neurology, Sleep Medicine

## 2021-12-07 ENCOUNTER — MYC MEDICAL ADVICE (OUTPATIENT)
Dept: SLEEP MEDICINE | Facility: CLINIC | Age: 26
End: 2021-12-07
Payer: COMMERCIAL

## 2021-12-14 ENCOUNTER — OFFICE VISIT (OUTPATIENT)
Dept: SLEEP MEDICINE | Facility: CLINIC | Age: 26
End: 2021-12-14
Payer: COMMERCIAL

## 2021-12-14 VITALS
HEART RATE: 100 BPM | BODY MASS INDEX: 40.92 KG/M2 | HEIGHT: 59 IN | OXYGEN SATURATION: 96 % | SYSTOLIC BLOOD PRESSURE: 118 MMHG | WEIGHT: 203 LBS | DIASTOLIC BLOOD PRESSURE: 78 MMHG

## 2021-12-14 DIAGNOSIS — G47.33 OSA (OBSTRUCTIVE SLEEP APNEA): Primary | ICD-10-CM

## 2021-12-14 PROCEDURE — 99214 OFFICE O/P EST MOD 30 MIN: CPT | Performed by: INTERNAL MEDICINE

## 2021-12-14 ASSESSMENT — MIFFLIN-ST. JEOR: SCORE: 1556.43

## 2021-12-14 NOTE — PROGRESS NOTES
Presenting history: Ms. Silke Crawford presents to clinic to discuss results of the home sleep test that she did on 11/30/2021.    Analysis Time:  502.5 minutes     Respiration:   Sleep Associated Hypoxemia: sustained hypoxemia was present. Baseline oxygen saturation was 94%.  Time with saturation less than or equal to 88% was 198 minutes. The lowest oxygen saturation was 62%.   Snoring: Snoring was present.  Respiratory events: The home study revealed a presence of 691 obstructive apneas and 255 mixed and central apneas. There were 62 hypopneas resulting in a combined apnea/hypopnea index [AHI] of 120.8 events per hour.  AHI was 120.8 per hour supine, - per hour prone, 121.5 per hour on left side, and - per hour on right side.   Pattern: Excluding events noted above, respiratory rate and pattern was Normal.     Position: Percent of time spent: supine - 85.9%, prone - 0%, on left - 13.7%, on right - 0%.     Heart Rate: By pulse oximetry normal rate was noted.      Assessment:   Severe obstructive sleep apnea.  Sleep associated hypoxemia was present.    Patient was counseled regarding severe obstructive sleep apnea, consequences of untreated disease and management.  We reviewed severity of her oxygen desaturations and possible sleep stages related exacerbation.      Patient tells me that after our initial consultation in September 2021, she started using her sister's CPAP device.  Apparently her sister was no longer using CPAP.  Her pressure settings are set to AutoPAP range of 10 to 20 cm H2O.  She reports that she had a significant benefit from the use of CPAP, which resolved snoring and daytime sleepiness.  Sleep quality has improved significantly.  Subjectively, she does not have any complaints regarding CPAP.    Data from her CPAP was reviewed.    ResMed (last 30 days)    Auto-PAP 10-20 cmH2O download:  24/30 total days of use. 6 nonuse days. 53% days with >4 hours use.  Average use 4 hours 43 minutes per day.  Median Leak 0.3 L/min. 95%ile Leak 19.6 L/min. CPAP 95% pressure 15cm. AHI 4.5    Impression and plan    1.  Severe obstructive sleep apnea  2.  Sleep-related hypoxemia    Patient has severe obstructive sleep apnea with an apnea-hypopnea index of 130.8/h.  There is significant sleep-related hypoxemia and oxygen desaturations that seem to be exacerbated in clusters, which suggests sleep stage related worsening.    Patient started using her sister's CPAP device, set to auto PAP settings of 10 to 20 cm H2O in September 2021.  Download data was reviewed which shows a satisfactory apnea-hypopnea index at 4.5/h for the last 30 days.  Auto CPAP 90th percentile pressure is 15 cm H2O.  Patient denies any pressure intolerance.  She wants to continue using this device as her own treatment.    Considering significant sleep-related hypoxemia, I recommended that we have a follow-up testing of oxygen saturations.  A titration PSG in the lab will be ideal.  Patient wants to do home sleep testing and I will have her complete a watch pat home sleep test on CPAP.    Plan:     1. Continue Auto PAP therapy   2. Watch PAT HST on CPAP   3.  Follow-up after watch pat testing    I spent a total of 35 minutes for this appointment on this date of service which include time spent before, during and after the visit for chart review, patient care, counseling and coordination of care.    Dr. Elio Galindo

## 2021-12-14 NOTE — PATIENT INSTRUCTIONS
Am I having a home sleep study?  --->Watch the video for the device you are using:    -/drop off device-   https://www.youtube.com/watch?v=yGGFBdELGhk    -Disposable device sent out require phone/computer application-   https://www.Linquet.com/watch?v=BCce_vbiwxE

## 2021-12-20 NOTE — TELEPHONE ENCOUNTER
----- Message from Ginger Khan sent at 12/14/2021  3:34 PM CST -----  Regarding: WatchPat  F/U with provider

## 2022-08-23 ENCOUNTER — OFFICE VISIT (OUTPATIENT)
Dept: URGENT CARE | Facility: URGENT CARE | Age: 27
End: 2022-08-23
Payer: COMMERCIAL

## 2022-08-23 ENCOUNTER — ANCILLARY PROCEDURE (OUTPATIENT)
Dept: GENERAL RADIOLOGY | Facility: CLINIC | Age: 27
End: 2022-08-23
Attending: PHYSICIAN ASSISTANT
Payer: COMMERCIAL

## 2022-08-23 ENCOUNTER — NURSE TRIAGE (OUTPATIENT)
Dept: NURSING | Facility: CLINIC | Age: 27
End: 2022-08-23

## 2022-08-23 VITALS
WEIGHT: 215 LBS | SYSTOLIC BLOOD PRESSURE: 138 MMHG | BODY MASS INDEX: 43.42 KG/M2 | DIASTOLIC BLOOD PRESSURE: 80 MMHG | OXYGEN SATURATION: 100 % | TEMPERATURE: 99.1 F | RESPIRATION RATE: 20 BRPM | HEART RATE: 84 BPM

## 2022-08-23 DIAGNOSIS — M25.469 EFFUSION OF KNEE, UNSPECIFIED LATERALITY: ICD-10-CM

## 2022-08-23 DIAGNOSIS — M23.92 INTERNAL DERANGEMENT OF KNEE, LEFT: ICD-10-CM

## 2022-08-23 DIAGNOSIS — M79.662 PAIN OF LEFT CALF: Primary | ICD-10-CM

## 2022-08-23 LAB — D DIMER PPP FEU-MCNC: 0.46 UG/ML FEU (ref 0–0.5)

## 2022-08-23 PROCEDURE — 85379 FIBRIN DEGRADATION QUANT: CPT | Performed by: PHYSICIAN ASSISTANT

## 2022-08-23 PROCEDURE — 36415 COLL VENOUS BLD VENIPUNCTURE: CPT | Performed by: PHYSICIAN ASSISTANT

## 2022-08-23 PROCEDURE — 73562 X-RAY EXAM OF KNEE 3: CPT | Mod: TC | Performed by: RADIOLOGY

## 2022-08-23 PROCEDURE — 99204 OFFICE O/P NEW MOD 45 MIN: CPT | Performed by: PHYSICIAN ASSISTANT

## 2022-08-23 RX ORDER — IBUPROFEN 800 MG/1
800 TABLET, FILM COATED ORAL EVERY 8 HOURS PRN
Qty: 30 TABLET | Refills: 0 | Status: SHIPPED | OUTPATIENT
Start: 2022-08-23 | End: 2022-09-23

## 2022-08-23 NOTE — TELEPHONE ENCOUNTER
"Nurse Triage SBAR    Is this a 2nd Level Triage? No    Situation: Patient is calling to report that she \"tweaked\" her L knee on Friday night while walking stairs with groceries; patient states she heard a pop. Patient states she has swelling in L knee, calf, foot, ankle, hurts when she stands on for too long. Is unable to fully extend her leg.     Background: Pain was worse on Saturday; pain almost made patient pass out twice on Saturday. Patient bought and has been using a knee brace.    Assessment:   Can bear weight on L knee but does have pain of 4-5 when up and walking.  Is wearing a knee brace intermittently  Has been icing and elevating    Recommendation: Per disposition, See HCP within 4 hours (or PCP Triage). Patient not currently established with [facility]. Recommended UC within 4 hours. Home care advice discussed. Advised patient to call back with any new or worsening symptoms. Patient verbalized understanding and agrees with plan.    Protocol Recommended Disposition: Urgent care center    Tana Olson RN on 8/23/2022 at 1:19 PM    Reason for Disposition    Severe limp (can only walk when assisted by crutch, person, etc)    [1] Thigh, calf, or ankle swelling AND [2] only 1 side    Additional Information    Negative: [1] Major bleeding (actively bleeding or spurting) AND [2] can't be stopped    Negative: Shock suspected (too weak to stand, passed out, not moving, unresponsive, pale cool skin, etc.)    Negative: Amputation or bone sticking through the skin    Negative: Serious injury with multiple fractures    Negative: Severe deformity (Exception: dislocated knee cap can go in by car)    Negative: Sounds like a life-threatening emergency to the triager    Negative: Ankle injury is the main concern    Negative: Leg injury that involves other parts of the leg    Negative: Muscle pain caused by excessive exercise or work (overuse)    Negative: Leg or foot pain not caused by an injury    Negative: Wound infection " suspected (cut or other wound now looks infected)    Negative: [1] Bleeding AND [2] won't stop after 10 minutes of direct pressure (using correct technique)    Negative: Skin is split open or gaping (if unsure, refer in if cut length > 1/2  inch or 12 mm)    Negative: Looks like a broken bone (crooked or deformed)    Negative: Displaced knee cap suspected    Negative: [1] Skin beyond injury is pale or blue AND [2] begins within 2 hours of injury     (Exception: bleeding into the skin)    Negative: Loss of sensation or muscle control below the knee    Negative: Can't stand (bear weight) or walk    Negative: Sounds like a serious injury to the triager    Negative: Crush type injury    Negative: Suspicious history for the injury (especially if not yet crawling)    Negative: Fever    Negative: Patient sounds very sick or weak to the triager    Negative: [1] SEVERE pain (e.g., excruciating, unable to walk) AND [2] not improved after 2 hours of pain medicine    Negative: [1] Can't move swollen joint at all AND [2] no fever    Negative: [1] Redness AND [2] painful when touched AND [3] no fever    Negative: [1] Thigh or calf pain AND [2] only 1 side AND [3] present > 1 hour    Protocols used: KNEE INJURY-P-AH, KNEE SWELLING-A-AH

## 2022-08-24 NOTE — PROGRESS NOTES
"  Assessment & Plan     Pain of left calf    D-dimer negative for DVT  Warm moist compresses  Motrin for calf pain prn    - D dimer, quantitative  - Orthopedic  Referral  - ibuprofen (ADVIL/MOTRIN) 800 MG tablet  Dispense: 30 tablet; Refill: 0    Internal derangement of knee, left    Xray knee Positive for left knee effusion  Referral to ortho for tapping knee, draining knee and having a cortisone injection    - XR Knee Left 3 Views  - Orthopedic  Referral  - ibuprofen (ADVIL/MOTRIN) 800 MG tablet  Dispense: 30 tablet; Refill: 0    Effusion of knee, unspecified laterality    - Orthopedic  Referral  - ibuprofen (ADVIL/MOTRIN) 800 MG tablet  Dispense: 30 tablet; Refill: 0         Nicotine/Tobacco Cessation:  She reports that she has been smoking. She has never used smokeless tobacco.  Nicotine/Tobacco Cessation Plan:         BMI:   Estimated body mass index is 43.42 kg/m  as calculated from the following:    Height as of 12/14/21: 1.499 m (4' 11\").    Weight as of this encounter: 97.5 kg (215 lb).       CONSULTATION/REFERRAL to [facility]    No follow-ups on file.    Larry Rao, [facility], PA-C  [facility]    Vannesa Edouard is a 27 year old, presenting for the following health issues:  Musculoskeletal Problem (Left knee pain since 8/19/2022 since carrying up groceries and heard \"pop\" in knee)      HEIDE     Silke Crawford, 27 year old, female presents to the urgent care today with:   Musculoskeletal Problem (Left knee pain since 8/19/2022 since carrying up groceries and heard \"pop\" in knee)      Review of Systems   Constitutional, HEENT, cardiovascular, pulmonary, gi and gu systems are negative, except as otherwise noted.      Objective    /80   Pulse 84   Temp 99.1  F (37.3  C)   Resp 20   Wt 97.5 kg (215 lb)   LMP  (LMP Unknown)   SpO2 100%   BMI 43.42 kg/m    Body mass index is 43.42 kg/m .  Physical Exam   GENERAL: healthy, alert and no distress  MS: " Positive for left knee swelling, effusion, left calf tenderness  SKIN: Positive for swelling left knee  NEURO: Normal strength and tone, mentation intact and speech normal  PSYCH: mentation appears normal, affect normal/bright    Xray - Reviewed and interpreted by me.  Positive for left knee effusion                .  ..

## 2022-09-11 ENCOUNTER — HEALTH MAINTENANCE LETTER (OUTPATIENT)
Age: 27
End: 2022-09-11

## 2022-09-23 ENCOUNTER — OFFICE VISIT (OUTPATIENT)
Dept: MIDWIFE SERVICES | Facility: CLINIC | Age: 27
End: 2022-09-23
Payer: COMMERCIAL

## 2022-09-23 VITALS
HEIGHT: 59 IN | DIASTOLIC BLOOD PRESSURE: 72 MMHG | SYSTOLIC BLOOD PRESSURE: 119 MMHG | WEIGHT: 215.6 LBS | BODY MASS INDEX: 43.46 KG/M2

## 2022-09-23 DIAGNOSIS — L72.9 SKIN CYST: Primary | ICD-10-CM

## 2022-09-23 PROBLEM — F41.9 ANXIETY: Status: ACTIVE | Noted: 2018-05-22

## 2022-09-23 PROBLEM — S62.629A CLOSED FRACTURE OF MIDDLE PHALANX OF FINGER: Status: ACTIVE | Noted: 2022-09-23

## 2022-09-23 PROBLEM — F33.2 MAJOR DEPRESSIVE DISORDER, RECURRENT SEVERE WITHOUT PSYCHOTIC FEATURES (H): Status: ACTIVE | Noted: 2017-10-23

## 2022-09-23 PROBLEM — L50.9 URTICARIA: Status: ACTIVE | Noted: 2022-09-23

## 2022-09-23 PROBLEM — G43.109 MIGRAINE WITH AURA AND WITHOUT STATUS MIGRAINOSUS, NOT INTRACTABLE: Status: ACTIVE | Noted: 2018-05-22

## 2022-09-23 PROCEDURE — 99203 OFFICE O/P NEW LOW 30 MIN: CPT | Performed by: NURSE PRACTITIONER

## 2022-09-23 RX ORDER — CEPHALEXIN 500 MG/1
CAPSULE ORAL
COMMUNITY
Start: 2022-08-15 | End: 2022-09-23

## 2022-09-23 RX ORDER — PREDNISONE 20 MG/1
20 TABLET ORAL 2 TIMES DAILY
COMMUNITY
Start: 2022-02-16 | End: 2022-09-23

## 2022-09-23 RX ORDER — CIPROFLOXACIN 500 MG/1
500 TABLET, FILM COATED ORAL 2 TIMES DAILY
COMMUNITY
Start: 2022-02-16 | End: 2022-09-23

## 2022-09-23 ASSESSMENT — ANXIETY QUESTIONNAIRES
5. BEING SO RESTLESS THAT IT IS HARD TO SIT STILL: NOT AT ALL
GAD7 TOTAL SCORE: 7
1. FEELING NERVOUS, ANXIOUS, OR ON EDGE: SEVERAL DAYS
GAD7 TOTAL SCORE: 7
7. FEELING AFRAID AS IF SOMETHING AWFUL MIGHT HAPPEN: MORE THAN HALF THE DAYS
3. WORRYING TOO MUCH ABOUT DIFFERENT THINGS: SEVERAL DAYS
2. NOT BEING ABLE TO STOP OR CONTROL WORRYING: SEVERAL DAYS
IF YOU CHECKED OFF ANY PROBLEMS ON THIS QUESTIONNAIRE, HOW DIFFICULT HAVE THESE PROBLEMS MADE IT FOR YOU TO DO YOUR WORK, TAKE CARE OF THINGS AT HOME, OR GET ALONG WITH OTHER PEOPLE: SOMEWHAT DIFFICULT
6. BECOMING EASILY ANNOYED OR IRRITABLE: MORE THAN HALF THE DAYS

## 2022-09-23 ASSESSMENT — LIFESTYLE VARIABLES
SKIP TO QUESTIONS 9-10: 0
HOW OFTEN DO YOU HAVE A DRINK CONTAINING ALCOHOL: 2-4 TIMES A MONTH
HOW MANY STANDARD DRINKS CONTAINING ALCOHOL DO YOU HAVE ON A TYPICAL DAY: 5 OR 6
HOW OFTEN DO YOU HAVE SIX OR MORE DRINKS ON ONE OCCASION: MONTHLY
AUDIT-C TOTAL SCORE: 6

## 2022-09-23 ASSESSMENT — PATIENT HEALTH QUESTIONNAIRE - PHQ9
5. POOR APPETITE OR OVEREATING: NOT AT ALL
SUM OF ALL RESPONSES TO PHQ QUESTIONS 1-9: 7

## 2022-09-23 NOTE — PROGRESS NOTES
SUBJECTIVE:                                                   Silke Crawford is a 27 year old who presents to clinic today for the following health issue(s):  Patient presents with:  Vaginal Problem: Patient reports finding lump on the groin area last night 9/22/22 and would like to get it checked out.  Patient states that there is no pain, and no tenderness.       HPI:  Silke states that she found a lump in her groin area that appeared yesterday, unsure if it is something to be concerned about.  She states that the bump is non-tender, appears more red today, does not feel like it could be ingrown hair.  She denies fever, chills.     Patient's last menstrual period was 09/10/2022 (exact date).  Menstrual History: frequency: every 28-30 days.  Last two cycles has been late  Patient is sexually active  No obstetric history on file..  Using condoms for contraception.   STI infx testing offered:  Declined    Problem list and histories reviewed & adjusted, as indicated.  Additional history: as documented.    Patient Active Problem List   Diagnosis     Morbid obesity (H)     ANNIKA (obstructive sleep apnea)     Anxiety     Closed fracture of middle phalanx of finger     Halitosis     Major depressive disorder, recurrent severe without psychotic features (H)     Menorrhagia with regular cycle     Migraine with aura and without status migrainosus, not intractable     Urticaria     Past Surgical History:   Procedure Laterality Date     wisdom teeth extraction        Social History     Tobacco Use     Smoking status: Light Tobacco Smoker     Smokeless tobacco: Never Used     Tobacco comment: occasional cigarette   Substance Use Topics     Alcohol use: Yes     Comment: 2-3 drinks per week      Problem (# of Occurrences) Relation (Name,Age of Onset)    Breast Cancer (1) Maternal Grandmother    Melanoma (1) Maternal Grandfather            No current outpatient medications on file.     No current facility-administered  "medications for this visit.     Allergies   Allergen Reactions     Amoxicillin Rash     Amoxicillin-Pot Clavulanate Rash     PN: LW Reaction: rash     Estrogens Other (See Comments)     MD told her not to take.  Avoid due to migraine with aura     Augmentin Hives and Rash       ROS:  12 point review of systems negative other than symptoms noted below or in the HPI.  Genitourinary: lump in right groin  12 point review of systems negative other than symptoms noted below.    OBJECTIVE:     /72   Ht 1.499 m (4' 11\")   Wt 97.8 kg (215 lb 9.6 oz)   LMP 09/10/2022 (Exact Date)   Breastfeeding No   BMI 43.55 kg/m    Body mass index is 43.55 kg/m .    PHYSICAL EXAM:  Constitutional:  Appearance: Well nourished, well developed alert, in no acute distress  Lymphatic: Lymph Nodes:  No other lymphadenopathy present  Skin: General Inspection:  No rashes present, lump approx. 1.5cm x 1.5 cm right groin, in groin crease/fold, non tender, slightly red, movable  No Pelvic Exam performed     In-Clinic Test Results:  No results found for this or any previous visit (from the past 24 hour(s)).    ASSESSMENT/PLAN:                                                        ICD-10-CM    1. Skin cyst  L72.9        COUNSELING:  -counseled on care for cyst per AVS, if signs and symptoms not improved in 3-5 days, notify clinic and will send in prescription for keflex 500mg PO q 12 hr x 7 days; has tolerated keflex in the past.   -if signs and symptoms are still not improved, consider sending to derm for consult  -rtc PRN and for annual exam    Mable LEE, BENIGNO, [facility]-BC  [phone]      "

## 2023-01-22 ENCOUNTER — HEALTH MAINTENANCE LETTER (OUTPATIENT)
Age: 28
End: 2023-01-22

## 2023-03-14 ENCOUNTER — APPOINTMENT (OUTPATIENT)
Dept: CARDIOLOGY | Facility: CLINIC | Age: 28
End: 2023-03-14
Payer: COMMERCIAL

## 2023-03-14 ENCOUNTER — HOSPITAL ENCOUNTER (EMERGENCY)
Facility: CLINIC | Age: 28
Discharge: HOME | End: 2023-03-14
Payer: COMMERCIAL

## 2023-03-14 ENCOUNTER — APPOINTMENT (OUTPATIENT)
Dept: GENERAL RADIOLOGY | Facility: CLINIC | Age: 28
End: 2023-03-14
Attending: EMERGENCY MEDICINE
Payer: COMMERCIAL

## 2023-03-14 VITALS
OXYGEN SATURATION: 98 % | RESPIRATION RATE: 14 BRPM | SYSTOLIC BLOOD PRESSURE: 136 MMHG | HEART RATE: 103 BPM | DIASTOLIC BLOOD PRESSURE: 78 MMHG | TEMPERATURE: 98.5 F

## 2023-03-14 DIAGNOSIS — R00.2 PALPITATIONS: ICD-10-CM

## 2023-03-14 LAB
ANION GAP SERPL CALCULATED.3IONS-SCNC: 9 MMOL/L (ref 7–15)
ATRIAL RATE - MUSE: 95 BPM
BASOPHILS # BLD AUTO: 0.1 10E3/UL (ref 0–0.2)
BASOPHILS NFR BLD AUTO: 1 %
BUN SERPL-MCNC: 12.9 MG/DL (ref 6–20)
CALCIUM SERPL-MCNC: 9.6 MG/DL (ref 8.6–10)
CHLORIDE SERPL-SCNC: 102 MMOL/L (ref 98–107)
CREAT SERPL-MCNC: 0.78 MG/DL (ref 0.51–0.95)
D DIMER PPP FEU-MCNC: <0.27 UG/ML FEU (ref 0–0.5)
DEPRECATED HCO3 PLAS-SCNC: 27 MMOL/L (ref 22–29)
DIASTOLIC BLOOD PRESSURE - MUSE: NORMAL MMHG
EOSINOPHIL # BLD AUTO: 0.2 10E3/UL (ref 0–0.7)
EOSINOPHIL NFR BLD AUTO: 2 %
ERYTHROCYTE [DISTWIDTH] IN BLOOD BY AUTOMATED COUNT: 12.1 % (ref 10–15)
GFR SERPL CREATININE-BSD FRML MDRD: >90 ML/MIN/1.73M2
GLUCOSE SERPL-MCNC: 101 MG/DL (ref 70–99)
HCG SERPL QL: NEGATIVE
HCT VFR BLD AUTO: 44.2 % (ref 35–47)
HGB BLD-MCNC: 14.4 G/DL (ref 11.7–15.7)
HOLD SPECIMEN: NORMAL
IMM GRANULOCYTES # BLD: 0 10E3/UL
IMM GRANULOCYTES NFR BLD: 0 %
INTERPRETATION ECG - MUSE: NORMAL
LYMPHOCYTES # BLD AUTO: 2.1 10E3/UL (ref 0.8–5.3)
LYMPHOCYTES NFR BLD AUTO: 24 %
MCH RBC QN AUTO: 28.9 PG (ref 26.5–33)
MCHC RBC AUTO-ENTMCNC: 32.6 G/DL (ref 31.5–36.5)
MCV RBC AUTO: 89 FL (ref 78–100)
MONOCYTES # BLD AUTO: 0.6 10E3/UL (ref 0–1.3)
MONOCYTES NFR BLD AUTO: 7 %
NEUTROPHILS # BLD AUTO: 5.7 10E3/UL (ref 1.6–8.3)
NEUTROPHILS NFR BLD AUTO: 66 %
NRBC # BLD AUTO: 0 10E3/UL
NRBC BLD AUTO-RTO: 0 /100
P AXIS - MUSE: 26 DEGREES
PLATELET # BLD AUTO: 425 10E3/UL (ref 150–450)
POTASSIUM SERPL-SCNC: 4.2 MMOL/L (ref 3.4–5.3)
PR INTERVAL - MUSE: 142 MS
QRS DURATION - MUSE: 76 MS
QT - MUSE: 368 MS
QTC - MUSE: 462 MS
R AXIS - MUSE: 67 DEGREES
RBC # BLD AUTO: 4.99 10E6/UL (ref 3.8–5.2)
SODIUM SERPL-SCNC: 138 MMOL/L (ref 136–145)
SYSTOLIC BLOOD PRESSURE - MUSE: NORMAL MMHG
T AXIS - MUSE: 49 DEGREES
TROPONIN T SERPL HS-MCNC: <6 NG/L
TSH SERPL DL<=0.005 MIU/L-ACNC: 1.26 UIU/ML (ref 0.3–4.2)
VENTRICULAR RATE- MUSE: 95 BPM
WBC # BLD AUTO: 8.6 10E3/UL (ref 4–11)

## 2023-03-14 PROCEDURE — 82310 ASSAY OF CALCIUM: CPT | Performed by: EMERGENCY MEDICINE

## 2023-03-14 PROCEDURE — 36415 COLL VENOUS BLD VENIPUNCTURE: CPT | Performed by: EMERGENCY MEDICINE

## 2023-03-14 PROCEDURE — 85379 FIBRIN DEGRADATION QUANT: CPT | Performed by: EMERGENCY MEDICINE

## 2023-03-14 PROCEDURE — 84484 ASSAY OF TROPONIN QUANT: CPT | Performed by: EMERGENCY MEDICINE

## 2023-03-14 PROCEDURE — 85014 HEMATOCRIT: CPT | Performed by: EMERGENCY MEDICINE

## 2023-03-14 PROCEDURE — 93005 ELECTROCARDIOGRAM TRACING: CPT

## 2023-03-14 PROCEDURE — 99285 EMERGENCY DEPT VISIT HI MDM: CPT | Mod: 25

## 2023-03-14 PROCEDURE — 84443 ASSAY THYROID STIM HORMONE: CPT | Performed by: EMERGENCY MEDICINE

## 2023-03-14 PROCEDURE — 71046 X-RAY EXAM CHEST 2 VIEWS: CPT

## 2023-03-14 PROCEDURE — 93242 EXT ECG>48HR<7D RECORDING: CPT

## 2023-03-14 PROCEDURE — 84703 CHORIONIC GONADOTROPIN ASSAY: CPT | Performed by: EMERGENCY MEDICINE

## 2023-03-14 ASSESSMENT — ACTIVITIES OF DAILY LIVING (ADL)
ADLS_ACUITY_SCORE: 35
ADLS_ACUITY_SCORE: 35

## 2023-03-14 NOTE — ED PROVIDER NOTES
"  History     Chief Complaint:  Palpitations       HPI   Silke Crawford is a 27 year old female with a history of anxiety who presents with palpitations. She reports that her intermittent palpitations started last night and were becoming more often this morning, with consistent episodes every 7-8 seconds for about 3 hours. She was measuring her heart rate on her watch that showed her heart rate to be . She describes the palpitations as \"fluttering\" that takes her breath away and causes her to cause. She notes of some chest discomfort described as an ache in the center of her chest and pain in her upper back/shoulders that she attributes to her posture. Patient denies recent immobilization, surgery, long travel, personal or family history of DVT or PE, or exogenous estrogen use. She denies cough, cold symptoms, fever, shortness of breath, leg swelling, leg pain or GI symptoms. She notes that her mother has history of atrial fibrillation. She drinks alcohol on the weekends. She also drinks 2 cups of coffee a day and has been using pre-workout for the last 1.5 months. She does not smoke or use other drugs.     Independent Historian:   None - Patient Only    Review of External Notes: No emergency room visits or urgent care visits noted in 2023.    ROS:  Review of Systems   A comprehensive ROS was obtained and is negative aside from those called out in the HPI above.    Allergies:  Amoxicillin  Amoxicillin-Pot Clavulanate  Estrogens  Augmentin     Medications:    The patient is not currently taking any prescribed medications.    Past Medical History:    ANNIKA  Seasonal depression  Migraine with aura  Anxiety  Morbid obesity  Halitosis  Menorrhagia with regular cycle    Past Surgical History:    [place] teeth extraction    Family History:    Father - hyperlipidemia, hypertension  Mother - gall stones  Sister - hypertension, migraines, obesity    Social History:  The patient presents to the ED male .  She " drinks alcohol on the weekends.  She does not smoke or use other drugs.   PCP: Shira Bustillos     Physical Exam     Patient Vitals for the past 24 hrs:   BP Temp Temp src Pulse Resp SpO2   03/14/23 1357 136/78 98.5  F (36.9  C) Temporal 103 14 98 %        Physical Exam  General: Remarkably pleasant adult female sitting in hallway with significant other.  HENT:   Head: Atraumatic.  Mouth/Throat: Oropharynx clear and moist.  Eyes: Conjunctive and EOM normal. PERRLA.  Neck: Normal ROM. No rigidity.  CV: Regular rate and rhythm. Normal S1, S2. No appreciable murmurs.  Resp: Lungs clear to auscultation bilaterally. Normal respiratory effort. No cough observed.  GI: Abdomen soft, non distended and nontender. No rebound or guarding.  MSK: Normal range of motion.  Skin: Warm and dry.  Neuro: Awake, alert, oriented x 3.  Psych: Normal mood and affect.      Emergency Department Course   ECG  ECG results from 03/14/23   EKG 12-lead, tracing only     Value    Systolic Blood Pressure     Diastolic Blood Pressure     Ventricular Rate 95    Atrial Rate 95    MI Interval 142    QRS Duration 76        QTc 462    P Axis 26    R AXIS 67    T Axis 49    Interpretation ECG      Sinus rhythm with marked sinus arrhythmia with occasional Premature ventricular complexes  Otherwise normal ECG  When compared with ECG of 05-NOV-2021 01:27,  Premature ventricular complexes are now Present         Imaging:  XR Chest 2 Views   Final Result   IMPRESSION: No acute cardiopulmonary disease.      TAMELA SAN MD            SYSTEM ID:  [id number]      Leadless EKG Monitor 3 to 7 Days    (Results Pending)      Report per radiology    Laboratory:  Labs Ordered and Resulted from Time of ED Arrival to Time of ED Departure   BASIC METABOLIC PANEL - Abnormal       Result Value    Sodium 138      Potassium 4.2      Chloride 102      Carbon Dioxide (CO2) 27      Anion Gap 9      Urea Nitrogen 12.9      Creatinine 0.78      Calcium 9.6       Glucose 101 (*)     GFR Estimate >90     TSH WITH FREE T4 REFLEX - Normal    TSH 1.26     TROPONIN T, HIGH SENSITIVITY - Normal    Troponin T, High Sensitivity <6     D DIMER QUANTITATIVE - Normal    D-Dimer Quantitative <0.27     HCG QUALITATIVE PREGNANCY - Normal    hCG Serum Qualitative Negative     CBC WITH PLATELETS AND DIFFERENTIAL    WBC Count 8.6      RBC Count 4.99      Hemoglobin 14.4      Hematocrit 44.2      MCV 89      MCH 28.9      MCHC 32.6      RDW 12.1      Platelet Count 425      % Neutrophils 66      % Lymphocytes 24      % Monocytes 7      % Eosinophils 2      % Basophils 1      % Immature Granulocytes 0      NRBCs per 100 WBC 0      Absolute Neutrophils 5.7      Absolute Lymphocytes 2.1      Absolute Monocytes 0.6      Absolute Eosinophils 0.2      Absolute Basophils 0.1      Absolute Immature Granulocytes 0.0      Absolute NRBCs 0.0          Emergency Department Course & Assessments:    Assessments:  1700 I obtained history and examined the patient as noted above and explained findings.  1734 Cardiology came and placed a Zio patch.     Independent Interpretation (X-rays, CTs, rhythm strip):  I reviewed the patient's chest x-ray and agree with read interpretation.    Consultations/Discussion of Management or Tests:  1715 I called cardiology to come place a Zio patch on patient.     Social Determinants of Health affecting care:   Patient states she needs referral for a primary care provider.    Disposition:  The patient was discharged to home.     Impression & Plan      Medical Decision Making:  Silke is a remarkably pleasant 27-year-old female who came into the emergency department today for evaluation of palpitations per HPI above.  On arrival here she was mildly tachycardic.  She has no personal cardiac history or history of arrhythmia.  Initial EKG here showed her to be in sinus rhythm with sinus arrhythmia and occasional PVCs.  No runs of these.  Rate at 95.  She was not describing chest  pain, but a mild ache when the fluttering comes on.  Troponin returned <6, ruling her out for acute cardiac ischemia.  Basic labs were undertaken.  She had no leukocytosis and was afebrile, unlikely infectious etiology.  No anemia.  No electrolyte derangement.  Thyroid studies normal.  She was not markedly short of breath and was 98% on room air.  Dimer testing was within normal limits, reassuring against pulmonary embolism as an etiology.  Chest x-ray returned showing no acute abnormalities.  I spoke to the patient at length at the bedside and explained that sometimes we cannot catch an arrhythmia here on her EKG.  I worked with cardiology and had a Zio patch placed for her here in the emergency department and she will wear this for 7 days.  I also provided a referral for primary care through [facility] and she will get a call for close recheck.  I explained to her that the Zio patch results can go to the primary care provider for follow-up.  In the meantime, return precautions were discussed.  Patient expressed understanding and was discharged home with clear follow-up      Diagnosis:    ICD-10-CM    1. Palpitations  R00.2 Primary Care Referral           Scribe Disclosure:  I, Michelle Lino, am serving as a scribe at 4:49 PM on 3/14/2023 to document services personally performed by Neda Thomas PA-C based on my observations and the provider's statements to me.     3/14/2023   Neda Thomas PA-C Dewing, Jennifer C, PA-C  03/14/23 [id number]

## 2023-03-14 NOTE — ED NOTES
Tele-PIT/Intake Evaluation      Video-Visit Details    Type of service:  Video Visit    Video Start Time (time video started): 1352  Video End Time (time video stopped): 1355  Originating Location (pt. Location): [facility]  Distant Location (provider location):  [facility]  Mode of Communication:  Video Conference via [employer]  Patient verbally consented to [facility].    History:  Palpitations and chest/back pain for 24 hours    Exam:  Tachycardic at 103    Appropriate interventions for symptom management were initiated if applicable.  Appropriate diagnostic tests were initiated if indicated.    MDM:  CBC, BMP, TSH, Dimer    I briefly evaluated the patient and developed an initial plan of care. I discussed this plan and explained that this brief interaction does not constitute a full evaluation. Patient/family understands that they should wait to be fully evaluated and discuss any test results with another clinician prior to leaving the hospital.           Mateo Rebolledo MD  03/14/23 0318

## 2023-03-14 NOTE — ED TRIAGE NOTES
Heart racing at this with HR averaging to 120s on apple watch with minor chest discomfort.      Triage Assessment     Row Name 03/14/23 1341       Triage Assessment (Adult)    Airway WDL WDL       Respiratory WDL    Respiratory WDL WDL       Skin Circulation/Temperature WDL    Skin Circulation/Temperature WDL WDL       Cardiac WDL    Cardiac WDL X       Peripheral/Neurovascular WDL    Peripheral Neurovascular WDL WDL       Cognitive/Neuro/Behavioral WDL    Cognitive/Neuro/Behavioral WDL WDL

## 2023-07-17 ENCOUNTER — OFFICE VISIT (OUTPATIENT)
Dept: OBGYN | Facility: CLINIC | Age: 28
End: 2023-07-17
Payer: COMMERCIAL

## 2023-07-17 VITALS
DIASTOLIC BLOOD PRESSURE: 64 MMHG | BODY MASS INDEX: 40.25 KG/M2 | HEIGHT: 61 IN | WEIGHT: 213.2 LBS | SYSTOLIC BLOOD PRESSURE: 104 MMHG

## 2023-07-17 DIAGNOSIS — Z13.228 SCREENING FOR METABOLIC DISORDER: ICD-10-CM

## 2023-07-17 DIAGNOSIS — Z11.4 SCREENING FOR HIV (HUMAN IMMUNODEFICIENCY VIRUS): ICD-10-CM

## 2023-07-17 DIAGNOSIS — Z11.8 SCREENING FOR CHLAMYDIAL DISEASE: ICD-10-CM

## 2023-07-17 DIAGNOSIS — Z12.4 SCREENING FOR CERVICAL CANCER: ICD-10-CM

## 2023-07-17 DIAGNOSIS — Z11.3 SCREEN FOR SEXUALLY TRANSMITTED DISEASES: ICD-10-CM

## 2023-07-17 DIAGNOSIS — Z11.59 NEED FOR HEPATITIS C SCREENING TEST: ICD-10-CM

## 2023-07-17 DIAGNOSIS — Z01.419 ENCOUNTER FOR GYNECOLOGICAL EXAMINATION WITHOUT ABNORMAL FINDING: Primary | ICD-10-CM

## 2023-07-17 DIAGNOSIS — Z13.6 SCREENING FOR CARDIOVASCULAR CONDITION: ICD-10-CM

## 2023-07-17 PROCEDURE — 87491 CHLMYD TRACH DNA AMP PROBE: CPT | Performed by: NURSE PRACTITIONER

## 2023-07-17 PROCEDURE — 87591 N.GONORRHOEAE DNA AMP PROB: CPT | Performed by: NURSE PRACTITIONER

## 2023-07-17 PROCEDURE — G0145 SCR C/V CYTO,THINLAYER,RESCR: HCPCS | Performed by: NURSE PRACTITIONER

## 2023-07-17 PROCEDURE — 87624 HPV HI-RISK TYP POOLED RSLT: CPT | Performed by: NURSE PRACTITIONER

## 2023-07-17 PROCEDURE — 99395 PREV VISIT EST AGE 18-39: CPT | Performed by: NURSE PRACTITIONER

## 2023-07-17 RX ORDER — LACTOBACILLUS RHAMNOSUS GG 10B CELL
1 CAPSULE ORAL 2 TIMES DAILY
COMMUNITY

## 2023-07-17 RX ORDER — BIOTIN 10000 MCG
CAPSULE ORAL
COMMUNITY

## 2023-07-17 ASSESSMENT — ANXIETY QUESTIONNAIRES
7. FEELING AFRAID AS IF SOMETHING AWFUL MIGHT HAPPEN: NEARLY EVERY DAY
6. BECOMING EASILY ANNOYED OR IRRITABLE: SEVERAL DAYS
2. NOT BEING ABLE TO STOP OR CONTROL WORRYING: MORE THAN HALF THE DAYS
GAD7 TOTAL SCORE: 9
1. FEELING NERVOUS, ANXIOUS, OR ON EDGE: SEVERAL DAYS
5. BEING SO RESTLESS THAT IT IS HARD TO SIT STILL: NOT AT ALL
3. WORRYING TOO MUCH ABOUT DIFFERENT THINGS: MORE THAN HALF THE DAYS
IF YOU CHECKED OFF ANY PROBLEMS ON THIS QUESTIONNAIRE, HOW DIFFICULT HAVE THESE PROBLEMS MADE IT FOR YOU TO DO YOUR WORK, TAKE CARE OF THINGS AT HOME, OR GET ALONG WITH OTHER PEOPLE: SOMEWHAT DIFFICULT
GAD7 TOTAL SCORE: 9

## 2023-07-17 ASSESSMENT — PATIENT HEALTH QUESTIONNAIRE - PHQ9
5. POOR APPETITE OR OVEREATING: NOT AT ALL
SUM OF ALL RESPONSES TO PHQ QUESTIONS 1-9: 5

## 2023-07-17 NOTE — PROGRESS NOTES
Silke is a 28 year old No obstetric history on file. female who presents for annual exam.     Besides routine health maintenance, she has no other health concerns today .    HPI:  The patient's PCP is  Shira Bustillos MD.        GYNECOLOGIC HISTORY:    Patient's last menstrual period was 07/16/2023.    Regular menses? yes  Menses every 28 days.  Length of menses: 5 days    Her current contraception method is: condoms.  She  reports that she has been smoking. She has never used smokeless tobacco.  Patient is sexually active.  STD testing offered?  Accepted  Last PHQ-9 score on record =       7/17/2023     8:26 AM   PHQ-9 SCORE   PHQ-9 Total Score 5     Last GAD7 score on record =       7/17/2023     8:26 AM   SKYLER-7 SCORE   Total Score 9     Alcohol Score = 2    HEALTH MAINTENANCE:  Health maintenance updated:  yes  Overdue          Never   Done ADVANCE CARE PLANNING (Every 5 Years)     Never   Done DEPRESSION ACTION PLAN (Once)     Never   Done HEPATITIS B IMMUNIZATION (1 of 3 - 3-dose series)     Never   Done COVID-19 Vaccine (1)     Never   Done Pneumococcal Vaccine: Pediatrics (0 to 5 Years) and At-Risk Patients (6 to 64 Years) (1 - PCV)     Never   Done HIV SCREENING (Once)     Never   Done HEPATITIS C SCREENING (Once)     APR 16 2020 YEARLY PREVENTIVE VISIT (Yearly)  Last completed: Apr 16, 2019 APR 16 2022 PAP (Every 3 Years)  Last completed: Apr 16, 2019     MAR 23   2023 PHQ-9 (Every 6 Months)  Last completed: Sep 23, 2022        Upcoming          AUG 23   2023 NICOTINE/TOBACCO CESSATION COUNSELING Q 1 YR (Yearly)  Last completed: Aug 23, 2022     SEP 1   2023 INFLUENZA VACCINE (1)  Last completed: Sep 25, 2017     MAY 7   2025 DTAP/TDAP/TD IMMUNIZATION (2 - Td or Tdap)  Last completed: May 7, 2015       HISTORY:  OB History   No obstetric history on file.       Patient Active Problem List   Diagnosis     Morbid obesity (H)     ANNIKA (obstructive sleep apnea)     Anxiety     Closed fracture  "of middle phalanx of finger     Halitosis     Major depressive disorder, recurrent severe without psychotic features (H)     Menorrhagia with regular cycle     Migraine with aura and without status migrainosus, not intractable     Urticaria     Past Surgical History:   Procedure Laterality Date     wisdom teeth extraction        Social History     Tobacco Use     Smoking status: Light Smoker     Smokeless tobacco: Never     Tobacco comments:     occasional cigarette   Substance Use Topics     Alcohol use: Yes     Comment: 2-3 drinks per week      Problem (# of Occurrences) Relation (Name,Age of Onset)    Breast Cancer (1) Maternal Grandmother    Melanoma (1) Maternal Grandfather            Current Outpatient Medications   Medication Sig     Biotin 10 MG CAPS      Emollient (COLLAGEN EX)      lactobacillus rhamnosus, GG, (CULTURELL) capsule Take 1 capsule by mouth 2 times daily     Multiple Vitamin (MULTIVITAMIN ADULT PO)      No current facility-administered medications for this visit.     Allergies   Allergen Reactions     Amoxicillin Rash     Amoxicillin-Pot Clavulanate Rash     PN: LW Reaction: rash     Estrogens Other (See Comments)     MD told her not to take.  Avoid due to migraine with aura     Amoxicillin-Pot Clavulanate Hives and Rash       Past medical, surgical, social and family histories were reviewed and updated in EPIC.    ROS:   12 point review of systems negative other than symptoms noted below or in the HPI.  Normal menstrual cycles    EXAM:  /64   Ht 1.549 m (5' 1\")   Wt 96.7 kg (213 lb 3.2 oz)   LMP 07/16/2023   BMI 40.28 kg/m     BMI: Body mass index is 40.28 kg/m .    PHYSICAL EXAM:  Constitutional:   Appearance: Well nourished, well developed, alert, in no acute distress  Neck:  Lymph Nodes:  No lymphadenopathy present    Thyroid:  Gland size normal, nontender, no nodules or masses present  on palpation  Chest:  Respiratory Effort:  Breathing unlabored  Cardiovascular:    Heart: " Auscultation:  Regular rate, normal rhythm, no murmurs present  Breasts: Inspection of Breasts:  No lymphadenopathy present., Palpation of Breasts and Axillae:  No masses present on palpation, no breast tenderness., Axillary Lymph Nodes:  No lymphadenopathy present. and No nodularity, asymmetry or nipple discharge bilaterally.  Gastrointestinal:   Abdominal Examination:  Abdomen nontender to palpation, tone normal without rigidity or guarding, no masses present, umbilicus without lesions   Liver and Spleen:  No hepatomegaly present, liver nontender to palpation    Hernias:  No hernias present  Lymphatic: Lymph Nodes:  No other lymphadenopathy present  Skin:  General Inspection:  No rashes present, no lesions present, no areas of  discoloration  Neurologic:    Mental Status:  Oriented X3.  Normal strength and tone, sensory exam                grossly normal, mentation intact and speech normal.    Psychiatric:   Mentation appears normal and affect normal/bright.         Pelvic Exam:  External Genitalia:     Normal appearance for age, no discharge present, no tenderness present, no inflammatory lesions present, color normal  Vagina:     Normal vaginal vault without central or paravaginal defects, no discharge present, no inflammatory lesions present, no masses present  Bladder:     Nontender to palpation  Urethra:   Urethral Body:  Urethra palpation normal, urethra structural support normal   Urethral Meatus:  No erythema or lesions present  Cervix:     Appearance healthy, no lesions present, nontender to palpation, no bleeding present  Uterus:     Uterus: firm, normal sized and nontender, midplane in position.   Adnexa:     No adnexal tenderness present, no adnexal masses present  Perineum:     Perineum within normal limits, no evidence of trauma, no rashes or skin lesions present  Anus:     Anus within normal limits, no hemorrhoids present  Inguinal Lymph Nodes:     No lymphadenopathy present  Pubic Hair:     Normal  pubic hair distribution for age  Genitalia and Groin:     No rashes present, no lesions present, no areas of discoloration, no masses present      COUNSELING:   Reviewed preventive health counseling, as reflected in patient instructions       Regular exercise       Healthy diet/nutrition       Contraception       Safe sex practices/STD prevention    BMI: Body mass index is 40.28 kg/m .  Weight management plan: Discussed healthy diet and exercise guidelines    ASSESSMENT:  28 year old female with satisfactory annual exam.    ICD-10-CM    1. Encounter for gynecological examination without abnormal finding  Z01.419       2. Screening for metabolic disorder  Z13.228 Comprehensive metabolic panel      3. Screening for cardiovascular condition  Z13.6 Lipid Profile      4. Screening for HIV (human immunodeficiency virus)  Z11.4 HIV Antigen Antibody Combo Cascade      5. Screen for sexually transmitted diseases  Z11.3 NEISSERIA GONORRHOEA PCR      6. Screening for chlamydial disease  Z11.8 CHLAMYDIA TRACHOMATIS PCR      7. Screening for cervical cancer  Z12.4 Pap thin layer screen with HPV - recommended age 30 - 65 years      8. Need for hepatitis C screening test  Z11.59 Hepatitis C antibody          PLAN:  Normal Gyn exam. Will notify patient with lab results when available.    Return prn or 1 year for annual.  Pap every 3 years if normal.  .    Laurie Blunt, APRN CNP

## 2023-07-18 LAB
C TRACH DNA SPEC QL NAA+PROBE: NEGATIVE
N GONORRHOEA DNA SPEC QL NAA+PROBE: NEGATIVE

## 2023-07-20 LAB
BKR LAB AP GYN ADEQUACY: NORMAL
BKR LAB AP GYN INTERPRETATION: NORMAL
BKR LAB AP HPV REFLEX: NORMAL
BKR LAB AP PREVIOUS ABNORMAL: NORMAL
PATH REPORT.COMMENTS IMP SPEC: NORMAL
PATH REPORT.COMMENTS IMP SPEC: NORMAL
PATH REPORT.RELEVANT HX SPEC: NORMAL

## 2023-07-21 LAB
HUMAN PAPILLOMA VIRUS 16 DNA: NEGATIVE
HUMAN PAPILLOMA VIRUS 18 DNA: NEGATIVE
HUMAN PAPILLOMA VIRUS FINAL DIAGNOSIS: NORMAL
HUMAN PAPILLOMA VIRUS OTHER HR: NEGATIVE

## 2024-09-15 ENCOUNTER — HEALTH MAINTENANCE LETTER (OUTPATIENT)
Age: 29
End: 2024-09-15

## 2025-04-01 ENCOUNTER — ANCILLARY ORDERS (OUTPATIENT)
Dept: MAMMOGRAPHY | Facility: CLINIC | Age: 30
End: 2025-04-01
Payer: COMMERCIAL

## 2025-04-01 DIAGNOSIS — Z80.3 FAMILY HISTORY OF BREAST CANCER: ICD-10-CM

## 2025-04-01 DIAGNOSIS — R92.30 DENSE BREAST TISSUE: Primary | ICD-10-CM

## 2025-04-11 NOTE — PROGRESS NOTES
Silke is a 29 year old  female who presents for annual exam.     Besides routine health maintenance, she has no other health concerns today .    HPI:  The patient's PCP is Shira Bustillos MD.      Silke is a 30 yo female with PMHx ANNIKA, MDD, menorrhagia, migraine with aura who presents for annual gynecologic exam.     Silke is doing well, no updates to personal or family health history.    She was last seen by myself on  with concern for possible new breast lump after significant weight loss with semiglutide and family hx of breast cancer. A diagnostic mammogram was ordered at that time. She has not scheduled this imaging yet, but she has been completing self breast exams as counseled.     Her menses are regular, occurring monthly and lasting 5 days with moderate to heavy flow. She is not using any cycle control medications.     She is sexually active with a new partner, accepts STI screening today. Using condoms for contraception.     Over the past year she has had intermittent cycles of constipation and diarrhea as well as significant GERD. These symptoms have worsened over the past month, now having recurrent nausea/vomiting and stomach pain. Requests a referral to GI today.     She is UTD with cervical cancer screening.     GYNECOLOGIC HISTORY:    Patient's last menstrual period was 2025 (exact date).    Regular menses? yes  Menses every 25 days.  Length of menses: 5 days    Her current contraception method is: condoms.  She  reports that she has been smoking cigarettes. She has been exposed to tobacco smoke. She has never used smokeless tobacco.    Patient is sexually active.  STD testing offered?  Accepted  Last PHQ-9 score on record =       3/28/2025     3:03 PM   PHQ-9 SCORE   PHQ-9 Total Score 16     Last GAD7 score on record =       3/28/2025     3:03 PM   SKYLER-7 SCORE   Total Score 7     HEALTH MAINTENANCE:  Cholesterol:   Recent Labs   Lab Test 19  1442   CHOL 206*   HDL  34*   *   TRIG 95      Cholesterol   Date Value Ref Range Status   2019 206 (H) <200 mg/dL Final     Comment:     Desirable:       <200 mg/dl     Next Mammo: never done  Pap:   Lab Results   Component Value Date    GYNINTERP  2023     Negative for Intraepithelial Lesion or Malignancy (NILM) HPV NEG    PAP NIL 2019      Colonoscopy: due at 43 years.  Dexa:  due at 63    Health maintenance updated:  yes    HISTORY:  OB History    Para Term  AB Living   0 0 0 0 0 0   SAB IAB Ectopic Multiple Live Births   0 0 0 0 0       Patient Active Problem List   Diagnosis    Morbid obesity (H)    ANNIKA (obstructive sleep apnea)    Anxiety    Closed fracture of middle phalanx of finger    Halitosis    Major depressive disorder, recurrent severe without psychotic features (H)    Menorrhagia with regular cycle    Migraine with aura and without status migrainosus, not intractable    Urticaria     Past Surgical History:   Procedure Laterality Date    wisdom teeth extraction        Social History     Tobacco Use    Smoking status: Light Smoker     Types: Cigarettes     Passive exposure: Current    Smokeless tobacco: Never    Tobacco comments:     occasional cigarette   Substance Use Topics    Alcohol use: Yes     Comment: 2-3 drinks per week      Problem (# of Occurrences) Relation (Name,Age of Onset)    Breast Cancer (1) Maternal Grandmother    Melanoma (1) Maternal Grandfather              Current Outpatient Medications   Medication Sig Dispense Refill    Biotin 10 MG CAPS       Emollient (COLLAGEN EX)       lactobacillus rhamnosus, GG, (CULTURELL) capsule Take 1 capsule by mouth 2 times daily      Multiple Vitamin (MULTIVITAMIN ADULT PO)        No current facility-administered medications for this visit.     Allergies   Allergen Reactions    Amoxicillin Rash    Amoxicillin-Pot Clavulanate Rash     PN: LW Reaction: rash    Estrogens Other (See Comments)     MD told her not to take.  Avoid due to  migraine with aura    Amoxicillin-Pot Clavulanate Hives and Rash       Past medical, surgical, social and family histories were reviewed and updated in EPIC.    EXAM:  /74   Wt 78.7 kg (173 lb 9.6 oz)   LMP 03/30/2025 (Exact Date)   BMI 32.80 kg/m     BMI: Body mass index is 32.8 kg/m .    PHYSICAL EXAM:  Constitutional:   Appearance: Well nourished, well developed, alert, in no acute distress  Neck:  Lymph Nodes:  No lymphadenopathy present    Thyroid:  Gland size normal, nontender, no nodules or masses present  on palpation  Chest:  Respiratory Effort:  Breathing unlabored  Cardiovascular:    Heart: Auscultation:  Regular rate, normal rhythm, no murmurs present  Breasts: Inspection of Breasts:  No lymphadenopathy present., Palpation of Breasts and Axillae:  No masses present on palpation, no breast tenderness., Axillary Lymph Nodes:  No lymphadenopathy present., and No nodularity, asymmetry or nipple discharge bilaterally. Highly dense fibrocystic tissue bilaterally.   Gastrointestinal:   Abdominal Examination:  Abdomen nontender to palpation, tone normal without rigidity or guarding, no masses present, umbilicus without lesions   Liver and Spleen:  No hepatomegaly present, liver nontender to palpation    Hernias:  No hernias present  Lymphatic: Lymph Nodes:  No other lymphadenopathy present  Skin:  General Inspection:  No rashes present, no lesions present, no areas of  discoloration  Neurologic:    Mental Status:  Oriented X3.  Normal strength and tone, sensory exam                grossly normal, mentation intact and speech normal.    Psychiatric:   Mentation appears normal and affect normal/bright.      Pelvic Exam:  External Genitalia:     Normal appearance for age, no discharge present, no tenderness present, no inflammatory lesions present, color normal  Vagina:     Normal vaginal vault without central or paravaginal defects, moderate discharge present, no inflammatory lesions present, no masses  present  Bladder:     Nontender to palpation  Urethra:   Urethral Body:  Urethra palpation normal, urethra structural support normal   Urethral Meatus:  No erythema or lesions present  Cervix:     Appearance healthy, no lesions present, nontender to palpation, scant menstrual bleeding present  Uterus:     Uterus: firm, normal sized and nontender  Adnexa:     No adnexal tenderness present, no adnexal masses present  Perineum:     Perineum within normal limits, no evidence of trauma, no rashes or skin lesions present  Anus:     Anus within normal limits, no hemorrhoids present  Inguinal Lymph Nodes:     No lymphadenopathy present  Pubic Hair:     Normal pubic hair distribution for age  Genitalia and Groin:     No rashes present, no lesions present, no areas of discoloration, no masses present    COUNSELING:   Reviewed preventive health counseling, as reflected in patient instructions       Regular exercise       Contraception       Safe sex practices/STD prevention    BMI: Body mass index is 32.8 kg/m .    ASSESSMENT:  29 year old female with satisfactory annual exam.    ICD-10-CM    1. Screen for STD (sexually transmitted disease)  Z11.3 Chlamydia & Gonorrhea by PCR, [facility] - Clinic Collect     HIV Antigen Antibody Combo     Treponema Abs w Reflex to RPR and Titer     Hepatitis C Screen Reflex to HCV RNA Quant and Genotype     HIV Antigen Antibody Combo     Treponema Abs w Reflex to RPR and Titer     Hepatitis C Screen Reflex to HCV RNA Quant and Genotype      2. Screening for lipid disorders  Z13.220 Lipid panel reflex to direct LDL Fasting     CANCELED: Lipid panel reflex to direct LDL Fasting      3. Encounter for vitamin deficiency screening  Z13.21 Vitamin D Deficiency     Vitamin D Deficiency      4. Recurrent vomiting  R11.10 Adult GI  Referral - Consult Only      5. Gastroesophageal reflux disease, unspecified whether esophagitis present  K21.9 Adult GI  Referral - Consult Only      6.  Constipation, unspecified constipation type  K59.00 Adult GI  Referral - Consult Only          PLAN:  Silke is a 30 yo female with PMHx ANNIKA, MDD, menorrhagia, migraine with aura who presents for annual gynecologic exam.     Bowel Change / GERD / Nausea / Vomiting  - discussed impact of diet, medications, routine  - referral placed to GI for further investigation     Breast Lump  - encouraged her to schedule diagnostic imaging  - continue self breast exams, monitor for changes    Health Maintenance  - PAP UTD, last done 7/2023 (NIL/-HPV). Counseled on ASCCP guidelines, patient requests early screening today, PAP collected, results of testing via PAP team when completed.   - Patient not fasting today, will return for fasting lipids, Vit D ordered. Results and recommendation via MyChart  - STI screening accepted, results via MyChart  - counseled on eligibility for flu and COVID vaccines  - Provided information about Honoring Choices advance health care planning    Patient can return in 1 year for annual exam, sooner with concerns.     Janelle Pizarro PA-C

## 2025-04-17 ENCOUNTER — OFFICE VISIT (OUTPATIENT)
Dept: OBGYN | Facility: CLINIC | Age: 30
End: 2025-04-17
Payer: COMMERCIAL

## 2025-04-17 VITALS — BODY MASS INDEX: 32.8 KG/M2 | WEIGHT: 173.6 LBS | SYSTOLIC BLOOD PRESSURE: 106 MMHG | DIASTOLIC BLOOD PRESSURE: 74 MMHG

## 2025-04-17 DIAGNOSIS — R11.10 RECURRENT VOMITING: ICD-10-CM

## 2025-04-17 DIAGNOSIS — Z12.4 SCREENING FOR CERVICAL CANCER: ICD-10-CM

## 2025-04-17 DIAGNOSIS — Z13.21 ENCOUNTER FOR VITAMIN DEFICIENCY SCREENING: ICD-10-CM

## 2025-04-17 DIAGNOSIS — K21.9 GASTROESOPHAGEAL REFLUX DISEASE, UNSPECIFIED WHETHER ESOPHAGITIS PRESENT: ICD-10-CM

## 2025-04-17 DIAGNOSIS — Z13.220 SCREENING FOR LIPID DISORDERS: ICD-10-CM

## 2025-04-17 DIAGNOSIS — Z11.3 SCREEN FOR STD (SEXUALLY TRANSMITTED DISEASE): Primary | ICD-10-CM

## 2025-04-17 DIAGNOSIS — K59.00 CONSTIPATION, UNSPECIFIED CONSTIPATION TYPE: ICD-10-CM

## 2025-04-17 LAB
HCV AB SERPL QL IA: NONREACTIVE
HIV 1+2 AB+HIV1 P24 AG SERPL QL IA: NONREACTIVE
T PALLIDUM AB SER QL: NONREACTIVE
VIT D+METAB SERPL-MCNC: 19 NG/ML (ref 20–50)

## 2025-04-17 NOTE — PATIENT INSTRUCTIONS
Thank you for visiting the [facility].    We care about your health! Here are some general tips to help you stay as healthy as possible:    - What we eat and drink provides the nutrients we need to keep our bodies working well. Try to eat a variety of foods including lots of vegetables, fruits, whole grains, and proteins. Try to limit foods and beverages with high sodium, trans fats, added sugars, and alcohol.    - Physical activity is beneficial for both your physical and mental health. Aim for at least 2.5 hours of medium-intensity (walking, biking, yoga, housework, etc) or 1.25 hours of high-intensity (running, jump rope, rowing, etc) aerobic physical activity per week in addition to engaging in strengthening activities at least 2 times per week. Start small and work towards a goal. Any activity is good activity!    - If you are not preventing pregnancy, take a folic acid supplement of 0.4 mg/day.    - Calcium helps build and maintain strong bones, muscles, and nerves. Daily total calcium intake (food + supplements) should be 1000mg (equal to 3-4 servings of calcium rich foods such as milk, cheese, yogurt, seafood, leafy green vegetables, edamame, orange juice with added calcium, and some cereals, among others)    - STI testing is always available to you. Please contact the office whenever you would like to update your testing or have a new or potential exposure.    - Follow sexually transmitted infection (STI) prevention tips: talk to all partners about STI testing, use condoms or other barrier methods to protect yourself and others, and get tested for STIs with each new partner.    - Return for a preventative visit every year    ADVANCE DIRECTIVE  We recommend that everyone make an advance directive about their health care goals and update it every 5 years.    When it comes to decisions about your health, it s important that your health care goals, values and wishes are known. In the  event of a sudden injury or illness, you may not be able to communicate your choices. We encourage you to begin by thinking about what matters most to you. Have conversations with family, friends, cultural and/or rad leaders, and your health care team. Then, share your goals and wishes for current and future healthcare so your voice is heard when treatment decisions need to be made. See the link below for help with talking about and sharing what is most important to you:     [email or web address]    Please do not hesitate to contact the office if you have any questions or concerns.

## 2025-04-21 LAB
HPV HR 12 DNA CVX QL NAA+PROBE: NEGATIVE
HPV16 DNA CVX QL NAA+PROBE: NEGATIVE
HPV18 DNA CVX QL NAA+PROBE: NEGATIVE
HUMAN PAPILLOMA VIRUS FINAL DIAGNOSIS: NORMAL

## 2025-04-22 LAB
BKR AP ASSOCIATED HPV REPORT: NORMAL
BKR LAB AP GYN ADEQUACY: NORMAL
BKR LAB AP GYN INTERPRETATION: NORMAL
BKR LAB AP LMP: NORMAL
BKR LAB AP PREVIOUS ABNORMAL: NORMAL
PATH REPORT.COMMENTS IMP SPEC: NORMAL
PATH REPORT.COMMENTS IMP SPEC: NORMAL
PATH REPORT.RELEVANT HX SPEC: NORMAL

## 2025-04-22 NOTE — PROGRESS NOTES
"Silke Crawford is a 29 year old patient who is being evaluated via a billable virtual visit.       How would you like to obtain your AVS? {AVS Preference:815475}  If the video visit is dropped, the invitation should be resent by: {video visit invitation (Optional) :609837}  Will anyone else be joining your video visit? {:011943}    Video-Visit Details     Type of service:  Video Visit     Video Start Time (time video started): ***     Video End Time (time video stopped): ***     Physician has received verbal consent for a Video Visit from the patient? {YES-NO  Default Yes:4444}     Originating Location (pt. Location): {video visit patient location:413108::\"Home\"}    Distant Location (provider location):  {virtual location provider:978340}    Platform used for Video Visit: {Virtual Visit Platforms:519547::\"[employer]\"}  " consistency of watery.  Denies hematochezia or melena abdominal discomfort usually precedes urge to defecate.  She had diarrhea subsides for short period of time after she takes Pepto-Bismol.  Patient suspect that she may have lactose intolerance as her symptoms get worse after consumption of dairy.  She has been on Ozempic for some time, was able to lose about 40 pounds in 2 years.       Wt Readings from Last 10 Encounters:   04/17/25 78.7 kg (173 lb 9.6 oz)   03/28/25 78.6 kg (173 lb 3.2 oz)   07/17/23 96.7 kg (213 lb 3.2 oz)   09/23/22 97.8 kg (215 lb 9.6 oz)   08/23/22 97.5 kg (215 lb)   12/14/21 92.1 kg (203 lb)   11/04/21 93 kg (205 lb)   09/20/21 93.9 kg (207 lb)   05/01/19 83.6 kg (184 lb 6.4 oz)   04/16/19 81.9 kg (180 lb 9.6 oz)       PREVIOUS ENDOSCOPY:  None  PERTINENT IMAGING STUDIES WERE REVIEWED IN EMR    HISTORY:   has a past medical history of ANNIKA (obstructive sleep apnea) (12/2/2021) and Seasonal depression.     has a past surgical history that includes wisdom teeth extraction.     reports that she has been smoking cigarettes. She has been exposed to tobacco smoke. She has never used smokeless tobacco. She reports current alcohol use. She reports current drug use. Drug: Marijuana.    family history includes Breast Cancer in her maternal grandmother; Melanoma in her maternal grandfather.    ALLERGIES:     Allergies   Allergen Reactions    Amoxicillin Rash    Amoxicillin-Pot Clavulanate Rash     PN: LW Reaction: rash    Estrogens Other (See Comments)     MD told her not to take.  Avoid due to migraine with aura    Amoxicillin-Pot Clavulanate Hives and Rash       PERTINENT MEDICATIONS:    Current Outpatient Medications:     esomeprazole (NEXIUM) 40 MG DR capsule, Take 1 capsule (40 mg) by mouth every morning (before breakfast). Take 30-60 minutes before eating., Disp: 30 capsule, Rfl: 1    Biotin 10 MG CAPS, , Disp: , Rfl:     Emollient (COLLAGEN EX), , Disp: , Rfl:     lactobacillus rhamnosus, GG,  (CULTURELL) capsule, Take 1 capsule by mouth 2 times daily, Disp: , Rfl:     Multiple Vitamin (MULTIVITAMIN ADULT PO), , Disp: , Rfl:     semaglutide (OZEMPIC) 2 MG/3ML pen, Inject 0.5 mg subcutaneously every 7 days., Disp: , Rfl:       ROS: 10pt ROS performed and otherwise negative.    PHYSICAL EXAMINATION:  Wt:   Wt Readings from Last 2 Encounters:   04/17/25 78.7 kg (173 lb 9.6 oz)   03/28/25 78.6 kg (173 lb 3.2 oz)      Physical Exam  Vitals reviewed: LMP 03/30/2025 (Exact Date)    Constitutional: aaox3, cooperative, pleasant, not dyspneic/diaphoretic, no acute distress  Eyes: Sclera anicteric/injected  Respiratory: Unlabored breathing, speaking in full sentences.   Psych: Normal affect, speech is clear and appropriate.Neatly groomed    RECENT LABS:   Recent Labs   Lab Test 03/14/23  1348 11/05/21  0126   WBC 8.6 13.7*   HGB 14.4 11.1*   HCT 44.2 35.3    409     Recent Labs   Lab Test 11/05/21  0126 04/16/19  1442   ALT 35 20   AST 19 13     Recent Labs   Lab Test 03/14/23  1348 11/05/21  0126   CR 0.78 0.71     TSH   Date Value Ref Range Status   03/28/2025 1.68 0.30 - 4.20 uIU/mL Final   04/16/2019 1.31 0.40 - 4.00 mU/L Final         ASSESSMENT/PLAN:    ICD-10-CM    1. Loose stools  R19.5       2. Gastroesophageal reflux disease, unspecified whether esophagitis present  K21.9 Adult GI  Referral - Consult Only     esomeprazole (NEXIUM) 40 MG DR capsule      3. Epigastric pain  R10.13 Lipase     CRP, inflammation      4. Vomiting and diarrhea  R11.10 CBC with platelets    R19.7 Comprehensive metabolic panel (BMP + Alb, Alk Phos, ALT, AST, Total. Bili, TP)     Tissue transglutaminase vitaliy IgA and IgG     IgA     Lipase     CRP, inflammation         Silke Crawford is a 29 year old female who presents to GI clinic for a consultation on ongoing diarrhea for the patient's entire life.  She reports that it is getting worse.  She also complains of having acid reflux for months, worse at nighttime.  It  wakes her up and leads to emesis.  Patient is wondering if she could have any damage to her esophagus or stomach from frequent emesis.  Denies red flag symptoms such as hematemesis, hematochezia, melena, or dysphagia.  She tried peppermint tablets, Pepcid, and Pepto-Bismol with questionable benefits.  Has been losing weight on Ozempic.  Denies abdominal surgery.  No family history of celiac disease, GI malignancy, or IBD.  Discussed differential diagnosis including but not limited to side effect of GLP-1, functional GI tract disorder, GERD, lactose intolerance, gastritis, peptic ulcer, hepatobiliary disease, celiac disease, chronic constipation with overflow diarrhea, microscopic colitis, or IBD.  We discussed options for further evaluation such as abdominal ultrasound and upper and lower GI endoscopy.  Decided to postpone the studies but instead, we will start a trial of his omeprazole 40 mg a day and a fiber supplement.  Educated to try lactose-free diet for a few weeks and see if that helps her diarrhea.  Ordered lab work.    Should the patient's symptoms persist or become worse, she knows to contact our office.  Patient verbalized understanding and appreciation of care provided. Stated that all of the questions were answered to her/his satisfaction.  Follow up in 6 to 8 weeks  This note was created with Dragon voice recognition software. Inadvertent minor typographic errors may occur in transcription. Feel free to contact the provider if you have any questions.  I sincerely appreciate an opportunity to provide consultation for this pleasant patient.    KWASI Cantu  [facility],  Gastroenterology,  [place], [place]

## 2025-04-29 NOTE — PATIENT INSTRUCTIONS
"It was a pleasure taking care of you today.  I've included a brief summary of our discussion and care plan from today's visit below.  Please review this information with your primary care provider.  ______________________________________________________________________    My recommendations are summarized as follows:    1.  As we discussed today, I sent a prescription for esomeprazole (Nexium) 40 mg a day for acid reflux.  This should help to heal any inflammation in your esophagus, stomach, or duodenum.  Take the medication 30 to 60 minutes before breakfast.    2.  Start a small dose of fiber supplement to regulate her bowel movements.  Below, I placed more information on fiber.  Please review at your convenience.    3.  I ordered lab work for further evaluation of your symptoms.  Please call [place] laboratory department to schedule blood draw (I listed phone number below).  I will send a xaitment message with lab results.    4.  If your abdominal pain, diarrhea, and vomiting persist, I plan to order upper GI endoscopy and colonoscopy.  Abdominal ultrasound could be also considered for evaluation of your gallbladder and liver.    5.  Try lactulose free diet for 2 weeks and see if that helps your diarrhea.    Return to GI Clinic in 6 to 8 weeks to review your progress.    ______________________________________________________________________  Gastroesophageal reflux  Gastroesophageal reflux, also called \"acid reflux,\" occurs when the stomach contents back up into the esophagus and/or mouth. Occasional reflux is normal and can happen in healthy infants, children, and adults, most often after eating a large meal. Most episodes are brief and do not cause bothersome symptoms or complications.   By contrast, people with gastroesophageal reflux disease (GERD) experience bothersome symptoms or damage to the esophagus as a result of acid reflux. Symptoms of GERD can include heartburn, regurgitation, and difficulty or pain " with swallowing.  The main cause of GERD is a transient relaxation or weakening of the lower esophageal sphincter (LES) which allows regurgitation of gastric acid and other gastric contents, including bile, back into the esophagus. The esophageal lining is susceptible to irritation by acid because it does not have the thick mucus protection of the stomach. Some people with GERD do not experience heartburn but may have burning sensation in the mouth, a feeling that food is stuck at any level of the esophagus, or hoarseness in the morning.  There are a number of predisposing factors associated with GERD, including a hiatal hernia, cigarette smoking, alcohol use, being overweight or obese, and pregnancy. Foods such as citrus fruits, chocolate, caffeinated drinks, fried foods, garlic, onions, spicy foods, and tomato-based foods, such as chili, pizza, and spaghetti sauce, are associated with heartburn symptoms. Consumption of large high-fat meals requires prolonged gastric passage times and the increased stomach pressure may lead to movement of hydrochloric acid from the stomach into the esophagus. Additionally, lying prone after a meal promotes backflow of stomach contents and the development of symptoms.      Lifestyle modifications for gastroesophageal reflux disease (GERD).   1. Change your eating habits.  -- It's best to eat several small meals instead of two or three large meals.  -- After you eat, wait 2 to 3 hours before you lie down. Late-night snacks aren't a good idea.   -- Chocolate, mint, and alcohol can make GERD worse. They relax the valve between the esophagus and the stomach.  -- Spicy foods, foods that have a lot of acid (like tomatoes and oranges), foods with high fat content, and coffee can make GERD symptoms worse in some people. If your symptoms are worse after you eat certain foods, try to avoid them.     2. Do not smoke or chew tobacco. Saliva helps to neutralize refluxed acid, and smoking reduces  the amount of saliva in the mouth and throat. Smoking also lowers the pressure in the lower esophageal sphincter and provokes coughing, causing frequent episodes of acid reflux in the esophagus.     3. If you have GERD symptoms at night, raise the head of your bed 6 in. (15 cm) to 8 in. (20 cm) by putting the frame on blocks or placing a foam wedge under the head of your mattress. (Adding extra pillows does not work.)    4. Avoid or reduce pressure on your stomach. Don't wear tight clothing around your middle.    5. Lose weight if you need to. Losing just 5 to 10 pounds can help.    6.Try diaphragmatic (belly) breathing. Research has indicated that people with GERD who practice belly breathing after eating reduce how often they experience acid reflux. Diaphragmatic breathing will reduce pressure within the stomach and increases tone of lower esophageal sphincter.  Practice these breathing exercises 10 times each. Try to do your exercises 3 to 4 times each day. You can lie on your back or sit up straight in a chair to do these exercises.  ?Diaphragmatic breathing :  Place 1 hand over your abdomen and the other on your chest.  Slowly take a deep breath in through your nose. When you do this, think about your breath moving the hand on your abdomen out. This pulls more air into your lungs. The hand on your chest should not move very much if you are breathing the right way.  Breathe out slowly through pursed lips. Gently press on your belly as you breathe out. This will push up on your diaphragm to help get your air out.  Repeat.     A high-fiber diet is a commonly recommended treatment for digestive problems, such as constipation, diarrhea, diverticulosis, irritable bowel syndrome, and hemorrhoids.   Most dietary fiber is not digested or absorbed, so it stays within the intestine where it modulates digestion of other foods and affects the consistency of stool. There are two types of fiber, each of which is thought to  have its own benefits:  ?Soluble fiber consists of a group of substances that is made of carbohydrates and dissolves in water. Examples of foods that contain soluble fiber include fruits, oats, barley, and legumes (peas and beans).  ?Insoluble fiber comes from plant cell walls and does not dissolve in water. Examples of foods that contain insoluble fiber include wheat, rye, and other grains, brown rice, quinoa, leafy greens, nuts, seeds, and skin-on fruits. Insoluble fiber has been recommended to treat digestive problems such as constipation, hemorrhoids, chronic diarrhea, and fecal incontinence.   ?Dietary fiber is the sum of all soluble and insoluble fiber.Fiber bulks the stool, making it softer and easier to pass. Fiber helps the stool pass regularly, although it is not a laxative.   - Recommended daily dose of fiber is 25-35 gram. It is difficult to consume this amount of fiber from food alone. Therefore, I would suggest to take fiber supplement.  - Please start supplementation with a powdered soluble fiber. When used on a daily basis, this can help regulate the consistency of your stools.   - Metamucil (psyllium) and Citrucel are preferred examples. You can start with 1-2 teaspoons per day, with goal to gradually increase the dose  to 1 tablespoon daily. You can increase up to 1 tablespoon three times daily if needed.   - It is important to stay well-hydrated with use of fiber supplementation and to make sure that the fiber powder is well mixed with water as directed on the label.   - You may experience some bloating with initiation of fiber, which will improve over the first few weeks. We will evaluate the effect  of fiber in 3-6 months.   - Of note, many of the fiber products contain artificial sweeteners, which can cause bloating, gas, and diarrhea in those who may be sensitive to artificial sweeteners. If this is the case, I would recommend trying Metamucil Premium Blend (with Stevia), Metamucil 4-in-1  "without Added Sweeteners, or [place]way (sweetened with Monk fruit extract).       Lactose intolerance  Lactose intolerance is a condition that makes it hard for your body to digest milk and foods made with milk (called dairy products). If you have lactose intolerance and you eat dairy products, you can get diarrhea, belly pain, and gas.  Lactose intolerance can affect anyone. But it is most common among , , and Black people.    In people who do not have lactose intolerance, the body makes a protein called an \"enzyme\" that breaks down lactose, the main form of sugar found in milk. In people who do have lactose intolerance, the body either does not make enough of the enzyme, or the enzyme does not work as well as it should. Also, some infections, such as you might get with food poisoning, can damage the enzyme. But if that happens, the problem usually goes away within a few weeks. Luckily, people with lactose intolerance can take an enzyme supplement to help with their problem.      The symptoms happen only after you eat dairy foods. They can include:  ?Cramps or belly pain (usually around or below the belly button)  ?Bloating (feeling like your belly is full of air)  ?Gas  ?Diarrhea (often it is bulky, foamy, and watery)  ?Vomiting (this happens mostly in teens)    You can start by cutting down but not stopping foods you know contain dairy. Dairy foods should be consumed with meals. Dairy foods include milk, cream, ice cream, yogurt, cheese, and butter.   If you are really sensitive to dairy foods or lactose, you will also need to read the labels on everything you eat. Milk or lactose is sometimes added to foods you might not suspect, such as cereal, instant soups, and salad dressings.     There are many over-the-counter enzyme supplements to choose from, including Lactaid (tablets or liquid), Lactrase, LactAce, Dairy Ease, and Lactrol. You should take the supplement right before you start eating. " If you forget, you can take it during the meal, but it might not work as well.  The important thing to know is that each product works a bit differently for each person. Plus, none of them can break down every last bit of lactose, so some people still have symptoms even with an enzyme supplement.       ____________________________________________________________________  Please see below for any additional questions and scheduling guidelines.    Sign up for [employer]: [employer] patient portal serves as a secure platform for accessing your medical records from the [facility]. Additionally, [employer] facilitates easy, timely, and secure messaging with your care team. If you have not signed up, you may do so by using the provided code or calling [phone].    Coordinating your care after your visit:  There are multiple options for scheduling your follow-up care based on your provider's recommendation.    How do I schedule a follow-up clinic appointment:   After your appointment, you may receive scheduling assistance with the Clinic Coordinators by having a seat in the waiting room and a Clinic Coordinator will call you up to schedule.  Virtual visits or after you leave the clinic:  Your provider has placed a follow-up order in the [employer] portal for scheduling your return appointment. A member of the scheduling team will contact you to schedule.  [employer] Scheduling: Timely scheduling through [employer] is advised to ensure appointment availability.   Call to schedule: You may schedule your follow-up appointment(s) by calling [phone], option 1.    How do I schedule my endoscopy or colonoscopy procedure:  If a procedure, such as a colonoscopy or upper endoscopy was ordered by your provider, the scheduling team will contact you to schedule this procedure. Or you may choose to call to schedule at   [phone], option 2.  Please allow 20-30 minutes when scheduling a procedure.    How do I get my blood work  done? To get your blood work done, you need to schedule a lab appointment at an [facility]. There are multiple ways to schedule:   At the clinic: The Clinic Coordinator you meet after your visit can help you schedule a lab appointment.   M[employer]ati scheduling: [employer] offers online lab scheduling at all [facility] laboratory locations.   Call to schedule: You can call [phone] to schedule your lab appointment.    How do I schedule my imaging study: To schedule imaging studies, such as CT scans, ultrasounds, MRIs, or X-rays, contact Imaging Services at [phone].    How do I schedule a referral to another doctor: If your provider recommended a referral to another specialist(s), the referral order was placed by your provider. You will receive a phone call to schedule this referral, or you may choose to call the number attached to the referral to self-schedule.    For Post-Visit Question(s):  For any inquiries following today's visit:  Please utilize [employer] messaging and allow 48 hours for reply or contact the Call Center during normal business hours at [phone], option 3.  For Emergent After-hours questions, contact the On-Call GI Fellow through the [facility] at [phone].  In addition, you may contact your Nurse directly using the provided contact information.    Test Results:  Test results will be accessible via [employer] in compliance with the 21st Century Cures Act. This means that your results will be available to you at the same time as your provider. Often you may see your results before your provider does. Results are reviewed by staff within two weeks with communication follow-up. Results may be released in the patient portal prior to your care team review.    Prescription Refill(s):  Medication prescribed by your provider will be addressed during your visit. For future refills, please coordinate with your pharmacy. If you have not had a recent clinic  visit or routine labs, for your safety, your provider may not be able to refill your prescription.     Sincerely,  KWASI Cantu,  [facility],  Division of Gastroenterology   ([facility])

## 2025-04-30 ENCOUNTER — VIRTUAL VISIT (OUTPATIENT)
Dept: GASTROENTEROLOGY | Facility: CLINIC | Age: 30
End: 2025-04-30
Payer: COMMERCIAL

## 2025-04-30 DIAGNOSIS — R11.10 VOMITING AND DIARRHEA: ICD-10-CM

## 2025-04-30 DIAGNOSIS — K21.9 GASTROESOPHAGEAL REFLUX DISEASE, UNSPECIFIED WHETHER ESOPHAGITIS PRESENT: ICD-10-CM

## 2025-04-30 DIAGNOSIS — R19.7 VOMITING AND DIARRHEA: ICD-10-CM

## 2025-04-30 DIAGNOSIS — R19.5 LOOSE STOOLS: Primary | ICD-10-CM

## 2025-04-30 DIAGNOSIS — R10.13 EPIGASTRIC PAIN: ICD-10-CM

## 2025-04-30 PROCEDURE — 98002 SYNCH AUDIO-VIDEO NEW MOD 45: CPT | Performed by: NURSE PRACTITIONER

## 2025-04-30 RX ORDER — ESOMEPRAZOLE MAGNESIUM 40 MG/1
40 CAPSULE, DELAYED RELEASE ORAL
Qty: 30 CAPSULE | Refills: 1 | Status: SHIPPED | OUTPATIENT
Start: 2025-04-30

## 2025-05-08 ENCOUNTER — HOSPITAL ENCOUNTER (OUTPATIENT)
Dept: MAMMOGRAPHY | Facility: CLINIC | Age: 30
Discharge: HOME | End: 2025-05-08
Payer: COMMERCIAL

## 2025-05-08 DIAGNOSIS — R92.30 DENSE BREAST TISSUE: ICD-10-CM

## 2025-05-08 DIAGNOSIS — Z80.3 FAMILY HISTORY OF BREAST CANCER: ICD-10-CM

## 2025-05-08 PROCEDURE — 77062 BREAST TOMOSYNTHESIS BI: CPT

## 2025-05-08 PROCEDURE — 76642 ULTRASOUND BREAST LIMITED: CPT | Mod: LT

## 2025-05-13 ENCOUNTER — HOSPITAL ENCOUNTER (OUTPATIENT)
Dept: MAMMOGRAPHY | Facility: CLINIC | Age: 30
Discharge: HOME | End: 2025-05-13
Payer: COMMERCIAL

## 2025-05-13 DIAGNOSIS — R92.30 DENSE BREAST TISSUE: ICD-10-CM

## 2025-05-13 DIAGNOSIS — Z80.3 FAMILY HISTORY OF BREAST CANCER: ICD-10-CM

## 2025-05-13 PROCEDURE — 88305 TISSUE EXAM BY PATHOLOGIST: CPT | Mod: 26 | Performed by: PATHOLOGY

## 2025-05-13 PROCEDURE — A4648 IMPLANTABLE TISSUE MARKER: HCPCS

## 2025-05-13 PROCEDURE — 999N000065 MA POST PROCEDURE LEFT

## 2025-05-13 PROCEDURE — 88305 TISSUE EXAM BY PATHOLOGIST: CPT | Mod: TC

## 2025-05-13 PROCEDURE — 250N000009 HC RX 250

## 2025-05-13 RX ADMIN — LIDOCAINE HYDROCHLORIDE 5 ML: 10 INJECTION, SOLUTION INFILTRATION; PERINEURAL at 14:06

## 2025-05-13 NOTE — DISCHARGE INSTRUCTIONS
After Your Breast Biopsy  Bleeding, bruising, and pain  Breast tenderness and some bruising is normal and may last several days. You may wear your bra overnight to support the breast.  You may use an ice pack for pain. Place it over the area for 15 to 20 minutes, several times a day.  You may take over-the-counter pain medicine:  On the day of the biopsy, we recommend Tylenol (acetaminophen) because it does not raise your risk of bleeding.  The next day, you may take an anti-inflammatory medicine (aspirin, ibuprofen, Motrin, Aleve, Advil), unless your doctor tells you not to.  Bandages and showering  Keep your bandage in place until tomorrow morning. Don't get it wet.  If you have small pieces of tape on the skin, leave them in place. They will fall off on their own, or you can remove them after 5 days.  You may shower the next morning after your biopsy.  Activity  No heavy activity (no running, no gym workouts, no lifting, no vacuuming, etc.) on the day of your biopsy.  You may go back to normal activity the next day. But limit what you do if you still have pain or discomfort.  Infection  Infection is rare. Signs of infection include:  Fever (including sweats and chills)  Redness  Pain that gets worse  Fluid draining from the biopsy site  Biopsy results  Results may take up to 5 business days.  A nurse or doctor from the Breast Center will call with your results. We will also send the results to the doctor that ordered your biopsy.  If you have not gotten your results in 5 days, please call the Breast Center.  Call the Breast Center with questions or if:   You have bleeding that lasts more than 20 minutes.  You have pain that you can't control.  You have signs of infection (fever, sweats, chills, redness, increasing pain, or drainage).  After hours, please call the doctor who ordered your biopsy.  For informational purposes only. Not to replace the advice of your health care provider. Copyright   2010 [facility]. All rights reserved. Clinically reviewed by Viola Courtney, Director, [facility]. StyleSeek [id number] - REV 08/23.

## 2025-05-14 ENCOUNTER — RESULTS FOLLOW-UP (OUTPATIENT)
Dept: MAMMOGRAPHY | Facility: CLINIC | Age: 30
End: 2025-05-14
Payer: COMMERCIAL

## 2025-05-14 LAB
PATH REPORT.COMMENTS IMP SPEC: NORMAL
PATH REPORT.FINAL DX SPEC: NORMAL
PATH REPORT.GROSS SPEC: NORMAL
PATH REPORT.MICROSCOPIC SPEC OTHER STN: NORMAL
PHOTO IMAGE: NORMAL

## 2025-05-14 NOTE — TELEPHONE ENCOUNTER
Call placed to Silke regarding pathology results from LEFT breast biopsy performed on 5/13/2025 revealing:     [facility]  Silke Crawford [id number]  F, 1995  Surgical Pathology Report (Final result) [id number]  Authorizing Provider: Janelle Pizarro PA-C Ordering Provider: Janelle Pizarro PA-C  Ordering Location: [facility]  Collected: 05/13/2025 02:08 PM  Pathologist: Anastasia Zamora MD Received: 05/13/2025 02:52 PM  .  Specimens  A Breast, Left, Breast Biospy Needle  .  .  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  Comment  Clinical and radiologic correlation recommended as to if the histologic findings correlate with the mass seen on imaging.  Slides were reviewed by Dr. Ama Main and she concurs with final diagnosis.     Per Breast Center Radiologist, Dr. Seng Martínez, results are concordant with imaging findings.     Recommendation: Clinical Follow-up     Results and Recommendations released into My Chart. Ordering provider was informed of the results and follow up plan. Left message for Silke to call [phone] with questions or concerns.      Anisa Durán RN BSN  Procedure Nurse  [facility]  [phone]

## 2025-05-14 NOTE — TELEPHONE ENCOUNTER
Silke returned my call.   verified.      Silke was given the below results and recommendations.  Silke denies any concerns with the biopsy site. Ordering provider was informed of the results and follow up plan.  I encouraged her to contact her doctor with any further breast concerns.  Patient verbalized understanding and agrees with the plan of care.        Anisa Durán RN BSN  Procedure Nurse  [facility]  [phone]
